# Patient Record
Sex: MALE | Race: WHITE | Employment: FULL TIME | URBAN - METROPOLITAN AREA
[De-identification: names, ages, dates, MRNs, and addresses within clinical notes are randomized per-mention and may not be internally consistent; named-entity substitution may affect disease eponyms.]

---

## 2017-01-12 ENCOUNTER — OFFICE VISIT (OUTPATIENT)
Dept: CARDIOLOGY CLINIC | Age: 57
End: 2017-01-12

## 2017-01-12 VITALS
BODY MASS INDEX: 25.31 KG/M2 | DIASTOLIC BLOOD PRESSURE: 68 MMHG | HEART RATE: 68 BPM | SYSTOLIC BLOOD PRESSURE: 110 MMHG | RESPIRATION RATE: 16 BRPM | OXYGEN SATURATION: 98 % | HEIGHT: 73 IN | WEIGHT: 191 LBS

## 2017-01-12 DIAGNOSIS — I42.1 IHSS (IDIOPATHIC HYPERTROPHIC SUBAORTIC STENOSIS) (HCC): Primary | ICD-10-CM

## 2017-01-12 DIAGNOSIS — R00.2 PALPITATIONS: ICD-10-CM

## 2017-01-12 DIAGNOSIS — I34.89 SYSTOLIC ANTERIOR MOVEMENT OF MITRAL VALVE: ICD-10-CM

## 2017-01-12 DIAGNOSIS — R00.2 HEART PALPITATIONS: ICD-10-CM

## 2017-01-12 DIAGNOSIS — I34.0 NON-RHEUMATIC MITRAL REGURGITATION: ICD-10-CM

## 2017-01-12 DIAGNOSIS — Z82.49 FAMILY HISTORY OF CARDIAC ARREST: ICD-10-CM

## 2017-01-12 NOTE — PATIENT INSTRUCTIONS
Schedule to obtain a 48 hr holter    Schedule an echocardiogram, we will call you with results    Schedule an appointment with Dr. Asad Wick to discuss ILR    Follow up with Dr. Abe Ayoub in 6 months

## 2017-01-12 NOTE — MR AVS SNAPSHOT
Visit Information Date & Time Provider Department Dept. Phone Encounter #  
 1/12/2017 10:00 AM Naomie Pizarro MD [facility] [phone] [id number] Upcoming Health Maintenance Date Due Hepatitis C Screening 1960 Pneumococcal 19-64 Medium Risk (1 of 1 - PPSV23) 12/16/1980 DTaP/Tdap/Td series (1 - Tdap) 12/16/1982 FOBT Q 1 YEAR AGE 50-75 12/16/2011 INFLUENZA AGE 9 TO ADULT 8/1/2016 Allergies as of 1/12/2017  Review Complete On: 1/12/2017 By: Jude Baldwin No Known Allergies Current Immunizations  Never Reviewed No immunizations on file. Not reviewed this visit You Were Diagnosed With   
  
 Codes Comments Non-rheumatic mitral regurgitation    -  Primary ICD-10-CM: I34.0 ICD-9-CM: 424.0 Hypertrophic obstructive cardiomyopathy (HOCM) ([facility])     ICD-10-CM: I42.1 ICD-9-CM: 425.11 Systolic anterior movement of mitral valve     ICD-10-CM: I34.8 ICD-9-CM: 424.0 Palpitations     ICD-10-CM: R00.2 ICD-9-CM: 785.1 IHSS (idiopathic hypertrophic subaortic stenosis) ([facility])     ICD-10-CM: I42.1 ICD-9-CM: 425.11 Vitals BP Pulse Resp Height(growth percentile) Weight(growth percentile) SpO2  
 110/68 (BP 1 Location: Left arm, BP Patient Position: Sitting) 68 16 6' 1\" (1.854 m) 191 lb (86.6 kg) 98% BMI Smoking Status 25.2 kg/m2 Never Smoker BMI and BSA Data Body Mass Index Body Surface Area  
 25.2 kg/m 2 2.11 m 2 Preferred Pharmacy Pharmacy Name Phone [address] [facility] [phone] Your Updated Medication List  
  
   
This list is accurate as of: 1/12/17 10:31 AM.  Always use your most recent med list.  
  
  
  
  
 aspirin 325 mg tablet Commonly known as:  ASPIRIN Take 325 mg by mouth daily. fluticasone 50 mcg/actuation nasal spray Commonly known as:  Lee Jacobo multivitamin tablet Commonly known as:  ONE A DAY Take 1 Tab by mouth daily. nebivolol 5 mg tablet Commonly known as:  BYSTOLIC Take 1 Tab by mouth daily. ZyrTEC 10 mg Cap Generic drug:  Cetirizine Take  by mouth. We Performed the Following AMB POC EKG ROUTINE W/ 12 LEADS, INTER & REP [84752 CPT(R)] To-Do List   
 01/12/2017 ECHO:  2D ECHO COMPLETE ADULT (TTE) W OR WO CONTR   
  
 01/12/2017 ECG:  ECG HOLTER MONITOR, UP TO 48 HRS Patient Instructions Schedule to obtain a 48 hr holter Schedule an echocardiogram, we will call you with results Schedule an appointment with Dr. Lorne Gambino to discuss ILR Follow up with Dr. Emmanuel Larsen in 6 months Introducing [facility] SERVICES! Dear Penelope Allan: 
Thank you for requesting a [employer] account. Our records indicate that you already have an active [employer] account. You can access your account anytime at [email or web address] Did you know that you can access your hospital and ER discharge instructions at any time in [employer]? You can also review all of your test results from your hospital stay or ER visit. Additional Information If you have questions, please visit the Frequently Asked Questions section of the [employer] website at [email or web address] Remember, [employer] is NOT to be used for urgent needs. For medical emergencies, dial 911. Now available from your iPhone and Android! Please provide this summary of care documentation to your next provider. Your primary care clinician is listed as Ellie Buck. If you have any questions after today's visit, please call [phone].

## 2017-01-12 NOTE — PROGRESS NOTES
Mikey Null     1960       Grant Suarez MD, [facility]  Date of Visit-1/12/2017   PCP is Brian Reynaga MD   [facility] [place]  [facility]  HPI:  Mikey Null is a 55 y.o. male   Patient with previous LV outflow tract obstructions and wall thickness consitent with IHSS. Last echo had shown regression of wall thickness. Patient is on bystolic. He reports persistent skipped beats occurring more at night during the month of December. He states he stopped drinking alcohol and caffeine. Since then his symptoms have improved significantly. Mild sensations of skipped beats have continued and typically occur after he eats a meal. He is physically active. Denies chest pain, edema, syncope or shortness of breath at rest, has no tachycardia    EKG: sinus rhythm within normal limits     Assessment/Plan:       Palpitations/arrhythmia   -unclear if atrial or ventricular given [facility]  -plan 48 hour Holter and refer to Dr Kalina Santos for ILR  -PVC's did decrease with stopping consuming caffeine and alcohol but there are times during the day where he likely cannot sense them     HOCM/MR/JUNIE  -repeat echo   -no exercise limitation on Bystolic     Lipids  -diet controlled     Follow up in 6 months   Future Appointments  Date Time Provider Johnathon Crowei   2/7/2017 8:00 AM ECHO, [address]   2/23/2017 3:00 PM Duke Mendoza  [address]   6/8/2017 1:40 PM MD SAMARA Mcclendon SCHED      Impression:   1. IHSS (idiopathic hypertrophic subaortic stenosis) ([address]    2. Systolic anterior movement of mitral valve    3. Palpitations    4. Non-rheumatic mitral regurgitation    5. Heart palpitations    6. Family history of cardiac arrest       Cardiac History:   Echo 2-23-16=dynamic obstruction during Paolo Bolder in the  outflow tract with a peak velocity of 3.8 cm/s and a peak gradient of 60  mmHg.  There was dynamic obstruction at rest in the outflow tract with a  peak velocity 3.3 m/s and a peak gradient of 45 mmHg. Systolic function  was normal. Ejection fraction  55 % to 65 %. Wall thickness was  mildly to moderately increased  Mitral valve:  mild regurgitation. Holter Monitor, 2012: PVCs , short run of bigeminy but no VT.  ECHO 12 LVEF of 75 % severe asymmetric hypertrophy of the septum. dynamic obstruction during Valsalva in the outflow tract, peak velocity of 3.6 cm/s and a peak gradient of 50 mmHg. IVS= 17 mm Pw= 9 mm.   mild mitral valve regurgitation systolic anterior motion (JUNIE)of the chordal structures. JENNIFER g no evidence of ischemia with the patient walking 9 minutes on the David Protocol and achieving 94% of MPHR. MRI which was terminated early on 13 due to a claustrophobic reaction demonstrated an LVEF of 76% with mild septal hypertrophy. There was a very steep aorto-septal angle of 107 degrees due to the sigmoid septum and the steep angulation of the aortic root creating an unfavorable mitral valve apparatus apposition with the septum leading to significant JUNIE of the chordae and left ventricular outflow tract obstruction. Social History: Nonsmoker. Two to three beers per day. Works as an , has five children. He is  and does a lot of activities with his children. He does some foreign travel though, mainly [place], once to [place]. He has had no illnesses there. maternal uncle with a history of CABG and MI who  at age 46 and a maternal grandfather with known coronary disease and \"heart attacks\" who  at age 50. Daughter had sudden collapse, cardiac arrest with exercising, seen at [facility]. ROS-except as noted above. . A complete cardiac and respiratory are reviewed and negative except as above ; Resp-denies wheezing  or productive cough,.  Const- No unusual weight loss or fever; Neuro-no recent seizure or CVA ; GI- No BRBPR, abdom pain, bloating ; - no  hematuria   see supplement sheet, initialed and to be scanned by staff  Past Medical History   Diagnosis Date    Asymmetric septal hypertrophy [facility])      MRI March 2013 and echo    Family history of cardiac arrest      luiser on treadmill, followed by EP at [facility] Dr Maddison [place] Hypertrophic cardiomyopathy ([facility]      dynamic obstruction during Valsalva in the outflow tract, peak velocity of 3.6 cm/s and a peak gradient of 50 mmHg    Mitral regurgitation     Systolic anterior movement of mitral valve      JUNIE      Social Hx= reports that he has never smoked. He does not have any smokeless tobacco history on file. He reports that he drinks alcohol. Exam and Labs:    Visit Vitals    /68 (BP 1 Location: Left arm, BP Patient Position: Sitting)    Pulse 68    Resp 16    Ht 6' 1\" (1.854 m)    Wt 191 lb (86.6 kg)    SpO2 98%    BMI 25.2 kg/m2      Constitutional:  NAD, comfortable  Head: NC,AT. Eyes: No scleral icterus. Neck:  Neck supple. No JVD present. Throat: moist mucous membranes. Chest: Effort normal & normal respiratory excursion . Neurological: alert, conversant and oriented . Skin: Skin is not cold. No obvious systemic rash noted. Not diaphoretic. No erythema. Psychiatric:  Grossly normal mood and affect. Behavior appears normal. Extremities:  no clubbing or cyanosis. Abdomen: non distended    Lungs:breath sounds normal. No stridor. distress, wheezes or  Rales. Heart:    normal rate, regular rhythm, normal S1, S2, 2/6 long systolic murmur well heard throughout the precordium evenly, rubs, clicks or gallops , PMI non displaced. Edema: Edema is none. No results found for: CHOL, CHOLX, CHLST, CHOLV, HDL, LDL, DLDL, LDLC, DLDLP, TGL, TGLX, TRIGL, TRIGP, CHHD, CHHDX  No results found for this or any previous visit.  No results found for: NA, K, CL, CO2, AGAP, GLU, BUN, CREA, BUCR, GFRAA, GFRNA, CA, GFRAA   Wt Readings from Last 3 Encounters:   01/12/17 191 lb (86.6 kg)   08/23/16 189 lb 6.4 oz (85.9 kg) 02/23/16 216 lb 6.4 oz (98.2 kg)      BP Readings from Last 3 Encounters:   01/12/17 110/68   08/23/16 100/60   02/23/16 116/82        Current Outpatient Prescriptions   Medication Sig    nebivolol (BYSTOLIC) 5 mg tablet Take 1 Tab by mouth daily.  fluticasone (FLONASE) 50 mcg/actuation nasal spray     Cetirizine (ZYRTEC) 10 mg cap Take  by mouth.  aspirin (ASPIRIN) 325 mg tablet Take 325 mg by mouth daily.  multivitamin (ONE A DAY) tablet Take 1 Tab by mouth daily. No current facility-administered medications for this visit. Impression see above.     Written by Curt Walton, as dictated by Kevin Pritchett MD.

## 2017-01-27 ENCOUNTER — CLINICAL SUPPORT (OUTPATIENT)
Dept: CARDIOLOGY CLINIC | Age: 57
End: 2017-01-27

## 2017-01-27 DIAGNOSIS — I42.1 IHSS (IDIOPATHIC HYPERTROPHIC SUBAORTIC STENOSIS) (HCC): ICD-10-CM

## 2017-01-27 DIAGNOSIS — R00.2 PALPITATIONS: ICD-10-CM

## 2017-02-07 ENCOUNTER — TELEPHONE (OUTPATIENT)
Dept: CARDIOLOGY CLINIC | Age: 57
End: 2017-02-07

## 2017-02-07 ENCOUNTER — CLINICAL SUPPORT (OUTPATIENT)
Dept: CARDIOLOGY CLINIC | Age: 57
End: 2017-02-07

## 2017-02-07 DIAGNOSIS — I42.1 HYPERTROPHIC OBSTRUCTIVE CARDIOMYOPATHY (HOCM) (HCC): ICD-10-CM

## 2017-02-07 DIAGNOSIS — I34.89 SYSTOLIC ANTERIOR MOVEMENT OF MITRAL VALVE: ICD-10-CM

## 2017-02-07 DIAGNOSIS — I34.0 NON-RHEUMATIC MITRAL REGURGITATION: ICD-10-CM

## 2017-02-07 DIAGNOSIS — R94.31 ABNORMAL EKG: ICD-10-CM

## 2017-02-07 DIAGNOSIS — Z95.818 STATUS POST PLACEMENT OF IMPLANTABLE LOOP RECORDER: ICD-10-CM

## 2017-02-07 DIAGNOSIS — Z82.49 FAMILY HISTORY OF CARDIAC ARREST: ICD-10-CM

## 2017-02-07 DIAGNOSIS — E78.00 HYPERCHOLESTEREMIA: ICD-10-CM

## 2017-02-07 DIAGNOSIS — I42.1 IHSS (IDIOPATHIC HYPERTROPHIC SUBAORTIC STENOSIS) (HCC): ICD-10-CM

## 2017-02-07 NOTE — PROGRESS NOTES
Having some PVCs as expected , with no atrial arrhythmia or VTach so that is good  Has echo tomorrow and Dr Mahsa Alvarado appt later  So keep same plans  2/7/2017   8:00 AM    Qing YUSUF 46  2/23/2017  3:00 PM    MD Kiah Rodriguez 78  6/8/2017   1:40 PM    MD Kiah Pavon 78

## 2017-02-07 NOTE — TELEPHONE ENCOUNTER
----- Message from Annalisa Cook MD sent at 2/6/2017  7:06 PM EST -----  Having some PVCs as expected , with no atrial arrhythmia or VTach so that is good  Has echo tomorrow and Dr Bruno Collins appt later  So keep same plans  2/7/2017   8:00 AM    Qing YUSUF  2/23/2017  3:00 PM    MD Kiah Heck 78  6/8/2017   1:40 PM    MD Kiah Lucas 78

## 2017-02-13 NOTE — PROGRESS NOTES
NO CHANGE, GOOD FX, MODEST GRADIENT ACROSS THE LV  2/23/2017  3:00 PM    Adolfo Rios MD            [place] [address]  6/8/2017   1:40 PM    Grant Bush MD        [address] [facility]

## 2017-02-14 ENCOUNTER — TELEPHONE (OUTPATIENT)
Dept: CARDIOLOGY CLINIC | Age: 57
End: 2017-02-14

## 2017-02-14 NOTE — TELEPHONE ENCOUNTER
----- Message from Naomie Pizarro MD sent at 2/12/2017  8:45 PM EST -----  NO CHANGE, GOOD FX, MODEST GRADIENT ACROSS THE LV  2/23/2017  3:00 PM    Fabio Arcos MD            [place] [address]  6/8/2017   1:40 PM    Grant Tamayo MD        [address] [facility]

## 2017-02-23 ENCOUNTER — TELEPHONE (OUTPATIENT)
Dept: CARDIOLOGY CLINIC | Age: 57
End: 2017-02-23

## 2017-03-10 ENCOUNTER — OFFICE VISIT (OUTPATIENT)
Dept: CARDIOLOGY CLINIC | Age: 57
End: 2017-03-10

## 2017-03-10 ENCOUNTER — TELEPHONE (OUTPATIENT)
Dept: CARDIOLOGY CLINIC | Age: 57
End: 2017-03-10

## 2017-03-10 VITALS
HEART RATE: 60 BPM | WEIGHT: 189 LBS | HEIGHT: 73 IN | DIASTOLIC BLOOD PRESSURE: 64 MMHG | BODY MASS INDEX: 25.05 KG/M2 | SYSTOLIC BLOOD PRESSURE: 92 MMHG

## 2017-03-10 DIAGNOSIS — I42.1 HYPERTROPHIC OBSTRUCTIVE CARDIOMYOPATHY (HOCM) (HCC): ICD-10-CM

## 2017-03-10 DIAGNOSIS — Z82.49 FAMILY HISTORY OF CARDIAC ARREST: ICD-10-CM

## 2017-03-10 DIAGNOSIS — E78.00 HYPERCHOLESTEREMIA: ICD-10-CM

## 2017-03-10 DIAGNOSIS — I34.0 NON-RHEUMATIC MITRAL REGURGITATION: ICD-10-CM

## 2017-03-10 DIAGNOSIS — I49.3 PVC (PREMATURE VENTRICULAR CONTRACTION): Primary | ICD-10-CM

## 2017-03-10 DIAGNOSIS — I34.89 SYSTOLIC ANTERIOR MOVEMENT OF MITRAL VALVE: ICD-10-CM

## 2017-03-10 DIAGNOSIS — R94.31 ABNORMAL EKG: ICD-10-CM

## 2017-03-10 RX ORDER — CHLORHEXIDINE GLUCONATE 4 G/100ML
SOLUTION TOPICAL
Qty: 1 BOTTLE | Refills: 0 | Status: SHIPPED | OUTPATIENT
Start: 2017-03-10 | End: 2017-04-06 | Stop reason: ALTCHOICE

## 2017-03-10 NOTE — TELEPHONE ENCOUNTER
Spoke with Mr. Kingsley Stevenson regarding his after visit instructions from today's appointment. Mr. Kingsley Stevenson left the office before we were able to discuss. He is agreeable to all dates/times, requests lab slips be mailed and instructions sent via [address] This was completed per his request. He was advised to contact office by phone or by [address] if any further questions or concerns. The patient verbalized understanding.

## 2017-03-10 NOTE — PROGRESS NOTES
Cardiac Electrophysiology Office Consultation Note     Subjective:      Xu Garcias is a 55 y.o. patient who is seen for evaluation of PVCs on holter  He has had 1000 or so of unifocal superior axis PVCs and they are relatively narrow consistent with inferoseptal origin  He has IHSS and since December he has felt PVCs despite taking bystolic  He has several echos and 2 stress echos  He has IHSS with high LVOT gradient with the most recent 2/2017 over 60 mmHg  On stress echo he had  mmHg but not sure if this is up or down during exercise based on report  He has no syncope or cardiac arrest  He has 5 children, 2 are fraternal twin  One of the twin was in [employer] and had VF arrest in the gym in 2012   She was transferred to [facility] and had S ICD by Dr Alexandra Turner  She then has had 2 VF ICD shocks and is now told to go to [facility] as there is concern for ARVC. She was in [place] at the time of third cardiac arrest  Her twin sister and other 3 siblings have been seen by Dr Aurea Martel and pediatric cardiologist and he is told that they are fine   He is avid exercise person    Patient Active Problem List   Diagnosis Code    Abnormal EKG R94.31    Hypertrophic obstructive cardiomyopathy (HOCM) ([facility] I42.1    Family history of cardiac arrest Z82.49    Mitral regurgitation T22.7    Systolic anterior movement of mitral valve I34.8    Hypercholesteremia E78.00     Current Outpatient Prescriptions   Medication Sig Dispense Refill    chlorhexidine (HIBICLENS) 4 % liquid Apply to upper chest each of 5 nights prior to procedure. 1 Bottle 0    nebivolol (BYSTOLIC) 5 mg tablet Take 1 Tab by mouth daily. 90 Tab 3    fluticasone (FLONASE) 50 mcg/actuation nasal spray       Cetirizine (ZYRTEC) 10 mg cap Take  by mouth.  aspirin (ASPIRIN) 325 mg tablet Take 325 mg by mouth daily.  multivitamin (ONE A DAY) tablet Take 1 Tab by mouth daily.        No Known Allergies  Past Medical History: Diagnosis Date    Asymmetric septal hypertrophy [facility])     MRI March 2013 and echo    Family history of cardiac arrest     светлана on treadmill, followed by EP at [facility] Dr Genaro Grewal Hypertrophic cardiomyopathy ([facility] [address]     dynamic obstruction during Valsalva in the outflow tract, peak velocity of 3.6 cm/s and a peak gradient of 50 mmHg    Mitral regurgitation     Systolic anterior movement of mitral valve     JUNIE     No past surgical history on file. No family history on file. Social History   Substance Use Topics    Smoking status: Never Smoker    Smokeless tobacco: Not on file    Alcohol use 0.0 oz/week     0 Standard drinks or equivalent per week      Comment: occ        Review of Systems:   Constitutional: Negative for fever, chills, weight loss, malaise/fatigue. HEENT: Negative for nosebleeds, vision changes. Respiratory: Negative for cough, hemoptysis  Cardiovascular: Negative for chest pain, + palpitations, no orthopnea, claudication, leg swelling, syncope, and PND. Gastrointestinal: Negative for nausea, vomiting, diarrhea, blood in stool and melena. Genitourinary: Negative for dysuria, and hematuria. Musculoskeletal: Negative for myalgias, arthralgia. Skin: Negative for rash. Heme: Does not bleed or bruise easily. Neurological: Negative for speech change and focal weakness     Objective:     Visit Vitals    BP 92/64 (BP 1 Location: Left arm, BP Patient Position: Sitting)    Pulse 60    Ht 6' 1\" (1.854 m)    Wt 189 lb (85.7 kg)    BMI 24.94 kg/m2      Physical Exam:   Constitutional: well-developed and well-nourished. No respiratory distress. Head: Normocephalic and atraumatic. Eyes: Pupils are equal, round  ENT: hearing normal  Neck: supple. No JVD present. Cardiovascular: Normal rate, regular rhythm. Exam reveals no gallop and no friction rub. 3/6 systolic LLSB murmur heard. Pulmonary/Chest: Effort normal and breath sounds normal. No wheezes. Abdominal: Soft, no tenderness. Musculoskeletal: no edema. Neurological: alert,oriented. Skin: Skin is warm and dry  Psychiatric: normal mood and affect. Behavior is normal. Judgment and thought content normal.      EKG: NSR with tall T waves      Assessment/Plan:       ICD-10-CM ICD-9-CM    1. PVC (premature ventricular contraction) I49.3 427.69    2. Abnormal EKG R94.31 794.31 MRI CARDIAC MORPH FUNC W WO CONT      CBC WITH AUTOMATED DIFF      METABOLIC PANEL, BASIC   3. Hypertrophic obstructive cardiomyopathy (HOCM) (HCC) I42.1 425.11 MRI CARDIAC MORPH FUNC W WO CONT      CBC WITH AUTOMATED DIFF      METABOLIC PANEL, BASIC   4. Family history of cardiac arrest Z82.49 V17.49 MRI CARDIAC MORPH FUNC W WO CONT      CBC WITH AUTOMATED DIFF      METABOLIC PANEL, BASIC   5. Non-rheumatic mitral regurgitation I34.0 424.0 MRI CARDIAC MORPH FUNC W WO CONT      CBC WITH AUTOMATED DIFF      METABOLIC PANEL, BASIC   6. Systolic anterior movement of mitral valve I34.8 424.0 MRI CARDIAC MORPH FUNC W WO CONT      CBC WITH AUTOMATED DIFF      METABOLIC PANEL, BASIC   7.  Hypercholesteremia E78.00 272.0 MRI CARDIAC MORPH FUNC W WO CONT      CBC WITH AUTOMATED DIFF      METABOLIC PANEL, BASIC     reviewed diet, exercise and weight control  reviewed medications and side effects in detail   I recommend him not to do vigorous exercise for now  He had partial cardiac MRI in 3/2013 with inferoseptum 15 mm and scar  He needs sedation for repeat MRI and will schedule  I agree that he has partial risk factors for sudden death with IHSS and not yet clear cut to recommend ICD but if his daughter does have IHSS and not ARVC based on [facility] evaluation or if he has ILR with VT he meets indication for ICD  MRI data will add more information and indication for ICD with LV thickness and scar burden  His BP is low so cannot increase bystolic dose  He agrees to proceed as he is considered moderate risk for now for sudden cardiac death and we need to risk stratify him    Follow-up Disposition:  Return in about 4 weeks (around 4/7/2017). Thank you for involving me in this patient's care and please call with further concerns or questions. Lisbet Greenwood M.D.   Electrophysiology/Cardiology  [facility] [place]  [address] [facility], [address], [address] [place] [address]  [phone]

## 2017-03-10 NOTE — MR AVS SNAPSHOT
Visit Information Date & Time Provider Department Dept. Phone Encounter #  
 3/10/2017  8:20 AM Abi Rm MD [facility] Panda Thacker [phone] [id number] Your Appointments 4/5/2017 11:20 AM  
ESTABLISHED PATIENT with Abi Rm MD  
[facility] ([facility]) Appt Note: 2 wk f/u ILR insertion wound check [address] [place] [address]  
[address] [facility]  
  
    
 6/8/2017  1:40 PM  
ESTABLISHED PATIENT with Lillian Aguirre MD  
[facility] ([facility]) Appt Note: 6 month f/u appt per Dr. Sloane Wood [address]  
[address] [address] Upcoming Health Maintenance Date Due Hepatitis C Screening 1960 Pneumococcal 19-64 Medium Risk (1 of 1 - PPSV23) 12/16/1980 DTaP/Tdap/Td series (1 - Tdap) 12/16/1982 FOBT Q 1 YEAR AGE 50-75 12/16/2011 INFLUENZA AGE 9 TO ADULT 8/1/2016 Allergies as of 3/10/2017  Review Complete On: 3/10/2017 By: Abi Rm MD  
 No Known Allergies Current Immunizations  Never Reviewed No immunizations on file. Not reviewed this visit You Were Diagnosed With   
  
 Codes Comments Abnormal EKG     ICD-10-CM: R94.31 
ICD-9-CM: 794.31 Hypertrophic obstructive cardiomyopathy (HOCM) (HCC)     ICD-10-CM: I42.1 ICD-9-CM: 425.11 Family history of cardiac arrest     ICD-10-CM: Z82.49 
ICD-9-CM: V17.49 Non-rheumatic mitral regurgitation     ICD-10-CM: I34.0 ICD-9-CM: 424.0 Systolic anterior movement of mitral valve     ICD-10-CM: I34.8 ICD-9-CM: 424.0 Hypercholesteremia     ICD-10-CM: E78.00 ICD-9-CM: 272.0 Vitals BP Pulse Height(growth percentile) Weight(growth percentile) BMI Smoking Status 92/64 (BP 1 Location: Left arm, BP Patient Position: Sitting) 60 6' 1\" (1.854 m) 189 lb (85.7 kg) 24.94 kg/m2 Never Smoker Vitals History BMI and BSA Data Body Mass Index Body Surface Area 24.94 kg/m 2 2.1 m 2 Preferred Pharmacy Pharmacy Name Phone [facility], [address] [phone] Your Updated Medication List  
  
   
This list is accurate as of: 3/10/17  9:42 AM.  Always use your most recent med list.  
  
  
  
  
 aspirin 325 mg tablet Commonly known as:  ASPIRIN Take 325 mg by mouth daily. chlorhexidine 4 % liquid Commonly known as:  HIBICLENS Apply to upper chest each of 5 nights prior to procedure. fluticasone 50 mcg/actuation nasal spray Commonly known as:  FLONASE  
  
 multivitamin tablet Commonly known as:  ONE A DAY Take 1 Tab by mouth daily. nebivolol 5 mg tablet Commonly known as:  BYSTOLIC Take 1 Tab by mouth daily. ZyrTEC 10 mg Cap Generic drug:  Cetirizine Take  by mouth. Prescriptions Sent to Pharmacy Refills  
 chlorhexidine (HIBICLENS) 4 % liquid 0 Sig: Apply to upper chest each of 5 nights prior to procedure. Class: Normal  
 Pharmacy: [facility], [address] #: [phone] We Performed the Following CBC WITH AUTOMATED DIFF [86518 CPT(R)] METABOLIC PANEL, BASIC [18919 CPT(R)] To-Do List   
 Around 03/10/2017 Imaging:  MRI CARDI[facility] F[facility] W WO CONT Referral Information Referral ID Referred By Referred To  
  
 [id number] [facility], [address] [facility] [address] Not Available Visits Status Start Date End Date 1 New Request 3/10/17 3/10/18 If your referral has a status of pending review or denied, additional information will be sent to support the outcome of this decision. Patient Instructions You will be scheduled for cardiac MRI with sedation. A hospital  will call you regarding date/time for this procedure. Your ILR insertion procedure has been scheduled for 3/22/2017 at 1:00pm, at [facility]. 
 
Please report to Admitting Department by 11:30am, or 2 hours prior to your scheduled procedure. Please bring a list of your current medications and medication bottles, if able, to the hospital on this day. You will need to have nothing to eat or drink after 7:00am, the day of your procedure. You may have small sips of water, if needed, to take with your medication. You will need labs drawn prior to your procedure. Please go to [facility] to have this done as soon as you are able. You should not stop your medications prior to your scheduled procedure. After your procedure, you will need to follow up with Dr. Thanh Smith. Your follow-up appointment has been scheduled for 4/5/2017 at 11:20am.  
 
 
Directions Hibiclens 4%: Start cleanse 5 days prior to procedure 1. Rinse area (upper chest and upper arms) with water. 2. Apply minimum amount necessary to scrub the upper chest area from shoulder/neck to mid line of chest and to below the nipple each of  5 nights before the day of the procedure 3. Let solution dry. Introducing [facility]! Dear Edmund Hudson: 
Thank you for requesting a [employer]xCell account. Our records indicate that you already have an active TxCell account. You can access your account anytime at [email or web address] Did you know that you can access your hospital and ER discharge instructions at any time in TxCell? You can also review all of your test results from your hospital stay or ER visit. Additional Information If you have questions, please visit the Frequently Asked Questions section of the TxCell website at [email or web address] Remember, TxCell is NOT to be used for urgent needs.  For medical emergencies, dial 911. Now available from your iPhone and Android! Please provide this summary of care documentation to your next provider. Your primary care clinician is listed as Oanh Pete. If you have any questions after today's visit, please call [phone].

## 2017-03-10 NOTE — PROGRESS NOTES
Chief Complaint   Patient presents with    Cardiomyopathy    Irregular Heart Beat     PVC's on holter, referred by Dr Isabel Medel     Verified patient with two types of identifiers. Verified medications with the patient. Verified pharmacy with patient.

## 2017-03-10 NOTE — PATIENT INSTRUCTIONS
You will be scheduled for cardiac MRI with sedation. A hospital  will call you regarding date/time for this procedure. Your ILR insertion procedure has been scheduled for 3/22/2017 at 1:00pm, at [facility].    Please report to Admitting Department by 11:30am, or 2 hours prior to your scheduled procedure. Please bring a list of your current medications and medication bottles, if able, to the hospital on this day. You will need to have nothing to eat or drink after 7:00am, the day of your procedure. You may have small sips of water, if needed, to take with your medication. You will need labs drawn prior to your procedure. Please go to [facility] to have this done as soon as you are able. You should not stop your medications prior to your scheduled procedure. After your procedure, you will need to follow up with Dr. Angella Villagomez. Your follow-up appointment has been scheduled for 4/5/2017 at 11:20am.       Directions Hibiclens 4%: Start cleanse 5 days prior to procedure   1. Rinse area (upper chest and upper arms) with water. 2. Apply minimum amount necessary to scrub the upper chest area from shoulder/neck to mid line of chest and to below the nipple each of  5 nights before the day of the procedure  3. Let solution dry.

## 2017-03-13 ENCOUNTER — TELEPHONE (OUTPATIENT)
Dept: CARDIOLOGY CLINIC | Age: 57
End: 2017-03-13

## 2017-03-13 NOTE — TELEPHONE ENCOUNTER
Attempted to reach patient by telephone. A message was left for return call. Previous order did request procedural sedation, will notify See Lopez when she returns my call.

## 2017-03-14 NOTE — TELEPHONE ENCOUNTER
Mayo Sidhu with coordination of care calling in regards to the order for the mri. States that sedation is listed as a note but the order itself does not include \"with sedation. \" Requests a call back at [phone]. Thanks!

## 2017-03-14 NOTE — TELEPHONE ENCOUNTER
Spoke with Elise Mantilla in [facility]. She states order will need to state sedation in the order itself, although is unsure how to do this. She will speak with the facility to determine how to best address this and will return call to our office to advise.

## 2017-03-17 RX ORDER — CEFAZOLIN SODIUM IN 0.9 % NACL 2 G/50 ML
2 INTRAVENOUS SOLUTION, PIGGYBACK (ML) INTRAVENOUS ONCE
Status: CANCELLED | OUTPATIENT
Start: 2017-03-17 | End: 2017-03-17

## 2017-03-17 RX ORDER — SODIUM CHLORIDE 0.9 % (FLUSH) 0.9 %
5-10 SYRINGE (ML) INJECTION EVERY 8 HOURS
Status: CANCELLED | OUTPATIENT
Start: 2017-03-17

## 2017-03-17 RX ORDER — SODIUM CHLORIDE 0.9 % (FLUSH) 0.9 %
5-10 SYRINGE (ML) INJECTION AS NEEDED
Status: CANCELLED | OUTPATIENT
Start: 2017-03-17

## 2017-03-18 LAB
BASOPHILS # BLD AUTO: 0.1 X10E3/UL (ref 0–0.2)
BASOPHILS NFR BLD AUTO: 1 %
BUN SERPL-MCNC: 20 MG/DL (ref 6–24)
BUN/CREAT SERPL: 27 (ref 9–20)
CALCIUM SERPL-MCNC: 10 MG/DL (ref 8.7–10.2)
CHLORIDE SERPL-SCNC: 97 MMOL/L (ref 96–106)
CO2 SERPL-SCNC: 24 MMOL/L (ref 18–29)
CREAT SERPL-MCNC: 0.74 MG/DL (ref 0.76–1.27)
EOSINOPHIL # BLD AUTO: 0.1 X10E3/UL (ref 0–0.4)
EOSINOPHIL NFR BLD AUTO: 2 %
ERYTHROCYTE [DISTWIDTH] IN BLOOD BY AUTOMATED COUNT: 13.2 % (ref 12.3–15.4)
GLUCOSE SERPL-MCNC: 99 MG/DL (ref 65–99)
HCT VFR BLD AUTO: 42.3 % (ref 37.5–51)
HGB BLD-MCNC: 15.1 G/DL (ref 12.6–17.7)
IMM GRANULOCYTES # BLD: 0 X10E3/UL (ref 0–0.1)
IMM GRANULOCYTES NFR BLD: 0 %
LYMPHOCYTES # BLD AUTO: 2.2 X10E3/UL (ref 0.7–3.1)
LYMPHOCYTES NFR BLD AUTO: 39 %
MCH RBC QN AUTO: 33.4 PG (ref 26.6–33)
MCHC RBC AUTO-ENTMCNC: 35.7 G/DL (ref 31.5–35.7)
MCV RBC AUTO: 94 FL (ref 79–97)
MONOCYTES # BLD AUTO: 0.5 X10E3/UL (ref 0.1–0.9)
MONOCYTES NFR BLD AUTO: 9 %
NEUTROPHILS # BLD AUTO: 2.8 X10E3/UL (ref 1.4–7)
NEUTROPHILS NFR BLD AUTO: 49 %
PLATELET # BLD AUTO: 266 X10E3/UL (ref 150–379)
POTASSIUM SERPL-SCNC: 4.3 MMOL/L (ref 3.5–5.2)
RBC # BLD AUTO: 4.52 X10E6/UL (ref 4.14–5.8)
SODIUM SERPL-SCNC: 140 MMOL/L (ref 134–144)
WBC # BLD AUTO: 5.6 X10E3/UL (ref 3.4–10.8)

## 2017-03-22 ENCOUNTER — HOSPITAL ENCOUNTER (OUTPATIENT)
Dept: CARDIAC CATH/INVASIVE PROCEDURES | Age: 57
Discharge: HOME | End: 2017-03-22
Attending: INTERNAL MEDICINE | Admitting: INTERNAL MEDICINE
Payer: SELF-PAY

## 2017-03-22 VITALS
RESPIRATION RATE: 16 BRPM | WEIGHT: 185 LBS | BODY MASS INDEX: 23 KG/M2 | TEMPERATURE: 97.9 F | SYSTOLIC BLOOD PRESSURE: 127 MMHG | HEIGHT: 75 IN | HEART RATE: 65 BPM | DIASTOLIC BLOOD PRESSURE: 69 MMHG | OXYGEN SATURATION: 100 %

## 2017-03-22 PROBLEM — Z95.818 STATUS POST PLACEMENT OF IMPLANTABLE LOOP RECORDER: Status: ACTIVE | Noted: 2017-03-22

## 2017-03-22 PROBLEM — I49.3 SYMPTOMATIC PVCS: Status: ACTIVE | Noted: 2017-03-22

## 2017-03-22 PROCEDURE — C1764 EVENT RECORDER, CARDIAC: HCPCS

## 2017-03-22 PROCEDURE — 33282 CARDIAC CATHETERIZATION: CPT

## 2017-03-22 PROCEDURE — 74011250636 HC RX REV CODE- 250/636: Performed by: INTERNAL MEDICINE

## 2017-03-22 PROCEDURE — 74011000250 HC RX REV CODE- 250: Performed by: INTERNAL MEDICINE

## 2017-03-22 RX ORDER — SODIUM CHLORIDE 0.9 % (FLUSH) 0.9 %
5-10 SYRINGE (ML) INJECTION AS NEEDED
Status: DISCONTINUED | OUTPATIENT
Start: 2017-03-22 | End: 2017-03-22 | Stop reason: HOSPADM

## 2017-03-22 RX ORDER — LIDOCAINE HYDROCHLORIDE AND EPINEPHRINE 10; 10 MG/ML; UG/ML
1.5 INJECTION, SOLUTION INFILTRATION; PERINEURAL
Status: DISCONTINUED | OUTPATIENT
Start: 2017-03-22 | End: 2017-03-22 | Stop reason: HOSPADM

## 2017-03-22 RX ORDER — LIDOCAINE HYDROCHLORIDE AND EPINEPHRINE 10; 10 MG/ML; UG/ML
1.5 INJECTION, SOLUTION INFILTRATION; PERINEURAL
Status: DISCONTINUED | OUTPATIENT
Start: 2017-03-22 | End: 2017-03-22 | Stop reason: CLARIF

## 2017-03-22 RX ORDER — CEPHALEXIN 250 MG/1
250 CAPSULE ORAL 3 TIMES DAILY
Qty: 9 CAP | Refills: 0 | Status: SHIPPED | OUTPATIENT
Start: 2017-03-22 | End: 2017-04-06 | Stop reason: ALTCHOICE

## 2017-03-22 RX ORDER — CEFAZOLIN SODIUM IN 0.9 % NACL 2 G/50 ML
2 INTRAVENOUS SOLUTION, PIGGYBACK (ML) INTRAVENOUS ONCE
Status: COMPLETED | OUTPATIENT
Start: 2017-03-22 | End: 2017-03-22

## 2017-03-22 RX ORDER — SODIUM CHLORIDE 0.9 % (FLUSH) 0.9 %
5-10 SYRINGE (ML) INJECTION EVERY 8 HOURS
Status: DISCONTINUED | OUTPATIENT
Start: 2017-03-22 | End: 2017-03-22 | Stop reason: HOSPADM

## 2017-03-22 RX ADMIN — LIDOCAINE HYDROCHLORIDE,EPINEPHRINE BITARTRATE 15 MG: 10; .01 INJECTION, SOLUTION INFILTRATION; PERINEURAL at 13:28

## 2017-03-22 RX ADMIN — CEFAZOLIN 2 G: 1 INJECTION, POWDER, FOR SOLUTION INTRAMUSCULAR; INTRAVENOUS; PARENTERAL at 13:25

## 2017-03-22 NOTE — DISCHARGE INSTRUCTIONS
PATIENT INSTRUCTIONS FOR IMPLANTABLE LOOP RECORDER    1. Start antibiotic today and continue for 3 days    2. May shower tomorrow. Gentle wash incision with soap and water. Do not scrub. 3.  Once your bandage and tape is taken off your incision will appear shiny, this is skin glue that will keep your incision closed, please DO NOT attempt to peel this off of incision. Please DO NOT try to scrub the skin glue off once you are able to take a shower. The skin glue will eventually fall off.       5.  Call Dr. Thnah Smith at [phone] if you experience any of the following symptoms:  1. Redness at the loop recorder site  2. Swelling at or around the loop recorder site   3. Pain around the loop recorder  4. Dizziness, lightheadedness, fainting spells  5. Lack of energy  6. Shortness of breath  7. Rapid heart rate  8. Chest or muscle twitches    6. Follow up with Dr Thanh Smtih as scheduled   @Future Appointments  Date Time Provider Johnathon Saxena   4/6/2017 8:00 AM Massimo Britt  [address]   4/12/2017 8:30 AM [facility] MRI 1 [facility] Saima Blayne   6/8/2017 1:40 PM Ruperto De La Torre  [address]   @     7. You may use Extra Strength Tylenol and ice pack for pain relief as needed. Yvette Tierney M.D.  [facility]  Electrophysiology/Cardiology  [facility] [place]  [address], [address]  [facility], [address] [place] [address]  [phone]

## 2017-03-22 NOTE — PROCEDURES
Loop Recorder Implantation    Date of procedure: 3/22/2017    PROCEDURE: Insertion of the implantable loop recorder     HISTORY: This is a 55 y.o. man with IHSS and PVC. There is not enough risk factors for AICD as primary prevention of sudden death. There is concern for ventricular tachycardia. His daughter had cardiac arrest and survived but she has ARVC and not IHSS. He is recommended the implantable loop recorder. Risks and benefits were discussed with patient. Consent was obtained. PROCEDURE IN DETAIL: After informed written consent was obtained, the patient was brought to the Electrophysiology Laboratory where she was prepped and draped in the usual in the usual sterile fashion. Local anesthesia with Xylocaine was given in the left infraclavicular area. A 1 cm incision was made below the left clavicle  The incision was made with a blunt blade and the loop recorder was inserted inside and the pocket was closed with Dermabond   Estimated blood loss: none  COMPLICATIONS: None. IMPLANTED HARDWARE: The loop recorder is Drawn to Scale [id number], serial # [id number]  Specimen removed: NONE  PROGRAMMED PARAMETERS: The programmed parameter is asystole for 3 seconds or more. Bradycardia for less than 40 beats per minute and AFIB/ventricular tachycardia is 160 beats per minute or more.     ASSESSMENT AND PLAN: The patient will be instructed her on how to do [employer] Remote Care link for further follow up

## 2017-03-22 NOTE — H&P (VIEW-ONLY)
Cardiac Electrophysiology Office Consultation Note     Subjective:      Ale Mancuso is a 55 y.o. patient who is seen for evaluation of PVCs on holter  He has had 1000 or so of unifocal superior axis PVCs and they are relatively narrow consistent with inferoseptal origin  He has IHSS and since December he has felt PVCs despite taking bystolic  He has several echos and 2 stress echos  He has IHSS with high LVOT gradient with the most recent 2/2017 over 60 mmHg  On stress echo he had  mmHg but not sure if this is up or down during exercise based on report  He has no syncope or cardiac arrest  He has 5 children, 2 are fraternal twin  One of the twin was in [employer] and had VF arrest in the gym in 2012   She was transferred to [facility] and had S ICD by Dr Bashir Gonsalez  She then has had 2 VF ICD shocks and is now told to go to [facility] as there is concern for ARVC. She was in [place] at the time of third cardiac arrest  Her twin sister and other 3 siblings have been seen by Dr Lizet Melo and pediatric cardiologist and he is told that they are fine   He is avid exercise person    Patient Active Problem List   Diagnosis Code    Abnormal EKG R94.31    Hypertrophic obstructive cardiomyopathy (HOCM) ([facility] [address] I42.1    Family history of cardiac arrest Z82.49    Mitral regurgitation P96.6    Systolic anterior movement of mitral valve I34.8    Hypercholesteremia E78.00     Current Outpatient Prescriptions   Medication Sig Dispense Refill    chlorhexidine (HIBICLENS) 4 % liquid Apply to upper chest each of 5 nights prior to procedure. 1 Bottle 0    nebivolol (BYSTOLIC) 5 mg tablet Take 1 Tab by mouth daily. 90 Tab 3    fluticasone (FLONASE) 50 mcg/actuation nasal spray       Cetirizine (ZYRTEC) 10 mg cap Take  by mouth.  aspirin (ASPIRIN) 325 mg tablet Take 325 mg by mouth daily.  multivitamin (ONE A DAY) tablet Take 1 Tab by mouth daily.        No Known Allergies  Past Medical History: Diagnosis Date    Asymmetric septal hypertrophy [facility])     MRI March 2013 and echo    Family history of cardiac arrest     светлана on treadmill, followed by EP at [facility] Dr Kyra Chun Hypertrophic cardiomyopathy ([facility] [address]     dynamic obstruction during Valsalva in the outflow tract, peak velocity of 3.6 cm/s and a peak gradient of 50 mmHg    Mitral regurgitation     Systolic anterior movement of mitral valve     JUNIE     No past surgical history on file. No family history on file. Social History   Substance Use Topics    Smoking status: Never Smoker    Smokeless tobacco: Not on file    Alcohol use 0.0 oz/week     0 Standard drinks or equivalent per week      Comment: occ        Review of Systems:   Constitutional: Negative for fever, chills, weight loss, malaise/fatigue. HEENT: Negative for nosebleeds, vision changes. Respiratory: Negative for cough, hemoptysis  Cardiovascular: Negative for chest pain, + palpitations, no orthopnea, claudication, leg swelling, syncope, and PND. Gastrointestinal: Negative for nausea, vomiting, diarrhea, blood in stool and melena. Genitourinary: Negative for dysuria, and hematuria. Musculoskeletal: Negative for myalgias, arthralgia. Skin: Negative for rash. Heme: Does not bleed or bruise easily. Neurological: Negative for speech change and focal weakness     Objective:     Visit Vitals    BP 92/64 (BP 1 Location: Left arm, BP Patient Position: Sitting)    Pulse 60    Ht 6' 1\" (1.854 m)    Wt 189 lb (85.7 kg)    BMI 24.94 kg/m2      Physical Exam:   Constitutional: well-developed and well-nourished. No respiratory distress. Head: Normocephalic and atraumatic. Eyes: Pupils are equal, round  ENT: hearing normal  Neck: supple. No JVD present. Cardiovascular: Normal rate, regular rhythm. Exam reveals no gallop and no friction rub. 3/6 systolic LLSB murmur heard. Pulmonary/Chest: Effort normal and breath sounds normal. No wheezes. Abdominal: Soft, no tenderness. Musculoskeletal: no edema. Neurological: alert,oriented. Skin: Skin is warm and dry  Psychiatric: normal mood and affect. Behavior is normal. Judgment and thought content normal.      EKG: NSR with tall T waves      Assessment/Plan:       ICD-10-CM ICD-9-CM    1. PVC (premature ventricular contraction) I49.3 427.69    2. Abnormal EKG R94.31 794.31 MRI CARDIAC MORPH FUNC W WO CONT      CBC WITH AUTOMATED DIFF      METABOLIC PANEL, BASIC   3. Hypertrophic obstructive cardiomyopathy (HOCM) (HCC) I42.1 425.11 MRI CARDIAC MORPH FUNC W WO CONT      CBC WITH AUTOMATED DIFF      METABOLIC PANEL, BASIC   4. Family history of cardiac arrest Z82.49 V17.49 MRI CARDIAC MORPH FUNC W WO CONT      CBC WITH AUTOMATED DIFF      METABOLIC PANEL, BASIC   5. Non-rheumatic mitral regurgitation I34.0 424.0 MRI CARDIAC MORPH FUNC W WO CONT      CBC WITH AUTOMATED DIFF      METABOLIC PANEL, BASIC   6. Systolic anterior movement of mitral valve I34.8 424.0 MRI CARDIAC MORPH FUNC W WO CONT      CBC WITH AUTOMATED DIFF      METABOLIC PANEL, BASIC   7.  Hypercholesteremia E78.00 272.0 MRI CARDIAC MORPH FUNC W WO CONT      CBC WITH AUTOMATED DIFF      METABOLIC PANEL, BASIC     reviewed diet, exercise and weight control  reviewed medications and side effects in detail   I recommend him not to do vigorous exercise for now  He had partial cardiac MRI in 3/2013 with inferoseptum 15 mm and scar  He needs sedation for repeat MRI and will schedule  I agree that he has partial risk factors for sudden death with IHSS and not yet clear cut to recommend ICD but if his daughter does have IHSS and not ARVC based on [facility] evaluation or if he has ILR with VT he meets indication for ICD  MRI data will add more information and indication for ICD with LV thickness and scar burden  His BP is low so cannot increase bystolic dose  He agrees to proceed as he is considered moderate risk for now for sudden cardiac death and we need to risk stratify him    Follow-up Disposition:  Return in about 4 weeks (around 4/7/2017). Thank you for involving me in this patient's care and please call with further concerns or questions. Isadora Chaparro M.D.   Electrophysiology/Cardiology  [facility] [place]  [facility], [address] [place] [address]  [phone]

## 2017-03-22 NOTE — INTERVAL H&P NOTE
H&P Update:  Ale Mancuso was seen and examined. History and physical has been reviewed. The patient has been examined.  There have been no significant clinical changes since the completion of the originally dated History and Physical.    Signed By: Lucina Rivera MD     March 22, 2017 3:29 PM

## 2017-03-22 NOTE — PROGRESS NOTES
Discussed with the patient and all questioned fully answered. He will call me if any problems arise.

## 2017-04-06 ENCOUNTER — OFFICE VISIT (OUTPATIENT)
Dept: CARDIOLOGY CLINIC | Age: 57
End: 2017-04-06

## 2017-04-06 DIAGNOSIS — Z82.49 FAMILY HISTORY OF CARDIAC ARREST: ICD-10-CM

## 2017-04-06 DIAGNOSIS — I42.1 HYPERTROPHIC OBSTRUCTIVE CARDIOMYOPATHY (HOCM) (HCC): ICD-10-CM

## 2017-04-06 DIAGNOSIS — Z95.818 STATUS POST PLACEMENT OF IMPLANTABLE LOOP RECORDER: Primary | ICD-10-CM

## 2017-04-06 NOTE — MR AVS SNAPSHOT
Visit Information Date & Time Provider Department Dept. Phone Encounter #  
 4/6/2017  8:00 AM Romain Farnsworth MD [facility] [phone] [id number] Your Appointments 6/8/2017  1:40 PM  
ESTABLISHED PATIENT with Miguel Ramirez MD  
[facility] ([facility]) Appt Note: 6 month f/u appt per Dr. Darylene Olmstead [address]  
[address] [facility] [address] Upcoming Health Maintenance Date Due Hepatitis C Screening 1960 Pneumococcal 19-64 Medium Risk (1 of 1 - PPSV23) 12/16/1980 DTaP/Tdap/Td series (1 - Tdap) 12/16/1982 FOBT Q 1 YEAR AGE 50-75 12/16/2011 INFLUENZA AGE 9 TO ADULT 8/1/2016 Allergies as of 4/6/2017  Review Complete On: 4/6/2017 By: Sarah Gil LPN No Known Allergies Current Immunizations  Never Reviewed No immunizations on file. Not reviewed this visit You Were Diagnosed With   
  
 Codes Comments Status post placement of implantable loop recorder    -  Primary ICD-10-CM: E34.468 ICD-9-CM: V45.09 Vitals Smoking Status Never Smoker Preferred Pharmacy Pharmacy Name Phone [facility], [address] [phone] Your Updated Medication List  
  
   
This list is accurate as of: 4/6/17  9:10 AM.  Always use your most recent med list.  
  
  
  
  
 aspirin 325 mg tablet Commonly known as:  ASPIRIN Take 325 mg by mouth daily. fluticasone 50 mcg/actuation nasal spray Commonly known as:  FLONASE  
  
 multivitamin tablet Commonly known as:  ONE A DAY Take 1 Tab by mouth daily. nebivolol 5 mg tablet Commonly known as:  BYSTOLIC Take 1 Tab by mouth daily. ZyrTEC 10 mg Cap Generic drug:  Cetirizine Take  by mouth.   
  
  
  
  
To-Do List   
 04/20/2017 7:45 AM  
  Appointment with [facility] MRI 1 at [facility] MRI ([phone]) GENERAL INSTRUCTIONS:  1. You MUST bring a  with you in order to receive sedation. 2. A nurse form MRI/Radiology will call with instructions and arrival time. Please follow their time and instructions only. FASTING GUIDELINES:  NPO Nothing by mouth after midnight unless otherwise instructed by the MRI/Radiology Nurse. Patient Instructions Follow up with Dr. Wander Harrington after your MRI. Introducing \[facility]\"" & [facility]! Dear Claudio Gerardo: 
Thank you for requesting a zoomsquare account. Our records indicate that you already have an active zoomsquare account. You can access your account anytime at [email or web address] Did you know that you can access your hospital and ER discharge instructions at any time in zoomsquare? You can also review all of your test results from your hospital stay or ER visit. Additional Information If you have questions, please visit the Frequently Asked Questions section of the zoomsquare website at [email or web address] Remember, zoomsquare is NOT to be used for urgent needs. For medical emergencies, dial 911. Now available from your iPhone and Android! Please provide this summary of care documentation to your next provider. Your primary care clinician is listed as Gisela Moreno. If you have any questions after today's visit, please call [phone].

## 2017-04-06 NOTE — PROGRESS NOTES
Cardiac Electrophysiology Wound Check Note      Wound Check s/p ILR March 22 for IHSS and PVC. Patient is here for wound check. No fever, drainage   He says the site is very sore still  Physical Exam   Constitutional: well-developed and well-nourished. Skin: Left side is healed without redness, drainage, hematoma. Mild swelling and bruising at left chest.       ASSESSMENT and PLAN     ICD-10-CM ICD-9-CM    1. Status post placement of implantable loop recorder Z95.818 V45.09      The incision is healing without redness, drainage, hematoma.    The patient has finished their anti-biotic  Device check shows proper functions  Reviewed indication for ILR in detail  Remote monitoring is working - no alerts seen    Follow-up Disposition:  6 months

## 2017-04-06 NOTE — PROGRESS NOTES
Chief Complaint   Patient presents with    Wound Check     Verified patient with two types of identifiers. Verified medications with the patient. Verified pharmacy with patient.

## 2017-04-06 NOTE — PROGRESS NOTES
Cardiac Electrophysiology Wound Check Note      Wound Check s/p ILR March 22 for IHSS and PVC. Patient is here for wound check. No fever, drainage   He says the site is very sore still with seat belt  Physical Exam   Constitutional: well-developed and well-nourished. Skin: Left side is healed without redness, drainage, hematoma. Mild bruising at left chest.       ASSESSMENT and PLAN     ICD-10-CM ICD-9-CM    1. Status post placement of implantable loop recorder Z95.818 V45.09    2. Hypertrophic obstructive cardiomyopathy (HOCM) ([facility]) I42.1 425.11    3. Family history of cardiac arrest Z82.49 V17.49      The incision is healing without redness, drainage, hematoma. The patient has finished their anti-biotic  Device check shows proper functions and no VT so far  Reviewed indication for ILR in detail.   His daughter is to be seen by Mary Grubbs next week for ARVD and he is going to have sedated MRI next week for IHSS  Remote monitoring is working - no alerts seen  I explained again about AICD indication for IHSS and if he does not like ILR size it is going to be tough with AICD    Follow-up Disposition: after the tests

## 2017-04-25 ENCOUNTER — OFFICE VISIT (OUTPATIENT)
Dept: CARDIOLOGY CLINIC | Age: 57
End: 2017-04-25

## 2017-04-25 DIAGNOSIS — I49.3 SYMPTOMATIC PVCS: Primary | ICD-10-CM

## 2017-04-25 DIAGNOSIS — I42.1 HYPERTROPHIC OBSTRUCTIVE CARDIOMYOPATHY (HOCM) (HCC): ICD-10-CM

## 2017-05-24 ENCOUNTER — HOSPITAL ENCOUNTER (OUTPATIENT)
Dept: MRI IMAGING | Age: 57
Discharge: HOME | End: 2017-05-24
Attending: INTERNAL MEDICINE
Payer: COMMERCIAL

## 2017-05-24 VITALS
BODY MASS INDEX: 23.12 KG/M2 | WEIGHT: 185 LBS | SYSTOLIC BLOOD PRESSURE: 123 MMHG | RESPIRATION RATE: 15 BRPM | HEART RATE: 62 BPM | DIASTOLIC BLOOD PRESSURE: 56 MMHG | OXYGEN SATURATION: 99 %

## 2017-05-24 DIAGNOSIS — I34.89 SYSTOLIC ANTERIOR MOVEMENT OF MITRAL VALVE: ICD-10-CM

## 2017-05-24 DIAGNOSIS — I42.1 HYPERTROPHIC OBSTRUCTIVE CARDIOMYOPATHY (HOCM) (HCC): ICD-10-CM

## 2017-05-24 DIAGNOSIS — R94.31 ABNORMAL EKG: ICD-10-CM

## 2017-05-24 DIAGNOSIS — Z82.49 FAMILY HISTORY OF CARDIAC ARREST: ICD-10-CM

## 2017-05-24 DIAGNOSIS — I34.0 NON-RHEUMATIC MITRAL REGURGITATION: ICD-10-CM

## 2017-05-24 DIAGNOSIS — E78.00 HYPERCHOLESTEREMIA: ICD-10-CM

## 2017-05-24 PROCEDURE — A9579 GAD-BASE MR CONTRAST NOS,1ML: HCPCS | Performed by: INTERNAL MEDICINE

## 2017-05-24 PROCEDURE — 99153 MOD SED SAME PHYS/QHP EA: CPT

## 2017-05-24 PROCEDURE — 75561 CARDIAC MRI FOR MORPH W/DYE: CPT

## 2017-05-24 PROCEDURE — 74011636320 HC RX REV CODE- 636/320: Performed by: INTERNAL MEDICINE

## 2017-05-24 PROCEDURE — 99152 MOD SED SAME PHYS/QHP 5/>YRS: CPT

## 2017-05-24 PROCEDURE — 74011250636 HC RX REV CODE- 250/636: Performed by: INTERNAL MEDICINE

## 2017-05-24 RX ORDER — FENTANYL CITRATE 50 UG/ML
100 INJECTION, SOLUTION INTRAMUSCULAR; INTRAVENOUS
Status: DISCONTINUED | OUTPATIENT
Start: 2017-05-24 | End: 2017-05-24

## 2017-05-24 RX ORDER — MIDAZOLAM HYDROCHLORIDE 1 MG/ML
5 INJECTION, SOLUTION INTRAMUSCULAR; INTRAVENOUS
Status: DISCONTINUED | OUTPATIENT
Start: 2017-05-24 | End: 2017-05-24

## 2017-05-24 RX ADMIN — GADOPENTETATE DIMEGLUMINE 34 ML: 469.01 INJECTION INTRAVENOUS at 09:52

## 2017-05-24 RX ADMIN — MIDAZOLAM HYDROCHLORIDE 1 MG: 1 INJECTION, SOLUTION INTRAMUSCULAR; INTRAVENOUS at 09:08

## 2017-05-24 RX ADMIN — MIDAZOLAM HYDROCHLORIDE 1 MG: 1 INJECTION, SOLUTION INTRAMUSCULAR; INTRAVENOUS at 09:15

## 2017-05-24 NOTE — PROGRESS NOTES
0820 patient brought to rad holding, ambulatory with steady gait, ID verified, no allergies, patient changed into gown, vitals taken, 22g IV placed in right AC without difficulty, no labs needed. Lungs clear on auscultation. Patient has implanted loop recorder in left upper chest.  Consent obtained     0905 Dr. Pena Her to bedside for consent    [id number]  patient on table   0910 ttime out  0920 procedure began  1008 procedure complete, patient tolerated well, total meds rreceived 2mg Versed as directed by Dr. Pena Her    21  patient returned to rad holding, placed on monitor x3, family updated on patient status. Discharge instructions printed and reviewed with patient. Patient taken via wheelchair to awaiting transportation home.

## 2017-05-24 NOTE — DISCHARGE INSTRUCTIONS
MRI Adult Sedation Discharge Instructions      Procedure Performed: Cardiac MRI    Medications Given:   Versed 2.5mg    Common side effects associated with each of these medications includes:  · Drowsiness, dizziness, euphoria, sleepiness or confusion  · Impaired memory recall  · Unsteady gait, loss of fine muscle control and delayed reaction time  · Visual disturbances, difficulty focusing, blurred vision    You may experience some of these side effects or you may not be aware of subtle changes in your behavior or reaction time. Because you received these medications, we are giving you the following instructions. Discharge Instructions:  · Do not consume alcoholic beverages for a minimum of 24 hours  · Do not make important personal, legal or business decisions for 24 hours  · Move slowly and carefully, do not make sudden position changes. Be alert for dizziness or lightheadedness and move accordingly. Have a responsible person assist you. · Do not drive for 24 hours  · Do not operate equipment for 24 hours - [employer], power tools, Kitchen accessories: stove, etc.    Diet/Diet Restrictions: Advance your diet as tolerated    Pain Management:   Avoid narcotic pain medications today till after 3pm to avoid interaction with sedation you received. Thank you for allowing us to care for your today. If you should have any questions about your procedure or follow up care, please contact a member of your radiology team at [phone] or if after hours, please call [phone]. If you feel you are having a life threatening emergency, call [phone]. If you report to an emergency room, doctors office or hospital within 24 hours, BRING THIS [address] and give it to the nurse or physician attending to you.

## 2017-05-24 NOTE — IP AVS SNAPSHOT
[address] 
[phone] Patient: Luanne Loya MRN: [id number] :1960 You are allergic to the following No active allergies Recent Documentation Weight BMI Smoking Status 83.9 kg 23.12 kg/m2 Never Smoker Emergency Contacts Name Discharge Info Relation Home Work Mobile Taniya Guo  Spouse [3] [phone] About your hospitalization You were admitted on:  May 24, 2017 You last received care in the:  [address] You were discharged on:  May 24, 2017 Unit phone number:  [phone] Why you were hospitalized Your primary diagnosis was:  Not on File Providers Seen During Your Hospitalizations Provider Role Specialty Primary office phone Светлана Sarmiento MD Attending Provider Cardiology [phone] Your Primary Care Physician (PCP) Primary Care Physician Office Phone Office Fax Mao Axjocelin [phone] Follow-up Information None Your Appointments   1:40 PM EDT  
ESTABLISHED PATIENT with Dimitry De León MD  
[facility] (Lev Velasquez) [address] [place] [address]  [address] [place] [address]  
[phone] Current Discharge Medication List  
  
ASK your doctor about these medications Dose & Instructions Dispensing Information Comments Morning Noon Evening Bedtime  
 aspirin 325 mg tablet Commonly known as:  ASPIRIN Your last dose was: Your next dose is:    
   
   
 Dose:  325 mg Take 325 mg by mouth daily. Refills:  0  
     
   
   
   
  
 fluticasone 50 mcg/actuation nasal spray Commonly known as:  [place] Mad Your last dose was: Your next dose is:    
   
   
  Refills:  0  
     
   
   
   
  
 multivitamin tablet Commonly known as:  ONE A DAY  
 Your last dose was: Your next dose is:    
   
   
 Dose:  1 Tab Take 1 Tab by mouth daily. Refills:  0  
     
   
   
   
  
 nebivolol 5 mg tablet Commonly known as:  BYSTOLIC Your last dose was: Your next dose is:    
   
   
 Dose:  5 mg Take 1 Tab by mouth daily. Quantity:  90 Tab Refills:  3 ZyrTEC 10 mg Cap Generic drug:  Cetirizine Your last dose was: Your next dose is: Take  by mouth. Refills:  0 Discharge Instructions MRI Adult Sedation Discharge Instructions Procedure Performed: Cardiac MRI Medications Given:  
Versed 2.5mg 
 
Common side effects associated with each of these medications includes: · Drowsiness, dizziness, euphoria, sleepiness or confusion · Impaired memory recall · Unsteady gait, loss of fine muscle control and delayed reaction time · Visual disturbances, difficulty focusing, blurred vision You may experience some of these side effects or you may not be aware of subtle changes in your behavior or reaction time. Because you received these medications, we are giving you the following instructions. Discharge Instructions: · Do not consume alcoholic beverages for a minimum of 24 hours · Do not make important personal, legal or business decisions for 24 hours · Move slowly and carefully, do not make sudden position changes. Be alert for dizziness or lightheadedness and move accordingly. Have a responsible person assist you. · Do not drive for 24 hours · Do not operate equipment for 24 hours - [employer], power tools, Kitchen accessories: stove, etc. 
 
Diet/Diet Restrictions: Advance your diet as tolerated Pain Management:  
Avoid narcotic pain medications today till after 3pm to avoid interaction with sedation you received. Thank you for allowing us to care for your today.   If you should have any questions about your procedure or follow up care, please contact a member of your radiology team at [phone] or if after hours, please call [phone]. If you feel you are having a life threatening emergency, call 911. If you report to an emergency room, doctors office or hospital within 24 hours, BRING THIS [address] and give it to the nurse or physician attending to you. Discharge Orders None Introducing [address]! Dear Elizabeth Estrada: 
Thank you for requesting a Goomeo account. Our records indicate that you already have an active Goomeo account. You can access your account anytime at [email or web address] - teamwork across global offices. [email or web address] - teamwork across global offices Did you know that you can access your hospital and ER discharge instructions at any time in Goomeo? You can also review all of your test results from your hospital stay or ER visit. Additional Information If you have questions, please visit the Frequently Asked Questions section of the Goomeo website at [email or web address] - teamwork across global offices. [email or web address] - teamwork across global offices/. Remember, Goomeo is NOT to be used for urgent needs. For medical emergencies, dial 911. Now available from your iPhone and Android! General Information Please provide this summary of care documentation to your next provider. Patient Signature:  ____________________________________________________________ Date:  ____________________________________________________________  
  
Gris Lewis Provider Signature:  ____________________________________________________________ Date:  ____________________________________________________________

## 2017-05-25 NOTE — PROGRESS NOTES
Cardiac MRI result showed moderate hypertrophy but not to degree that ICD can be indicated  Interestingly there is no scar  Dr Amina Augustin discussed LVOT obstruction with me  He had stress echo in 2015 with high gradient and moderate MR  He prefers to go back to his routine exercise  I recommend stress echo with attention to BP respond and LVOT gradient with exercise before we can let him to return to his exercise routine and this could also help risk stratify ICD indication  He has upcoming appointment with Dr Airam Peoples and if ok with scheduling he may get that done same day    Still need record of evaluation of ARVC for his daughter from [facility] if he can provide    6/8/2017   1:40 PM    Michelle Correa MD        [place] [address]

## 2017-05-25 NOTE — PROGRESS NOTES
Cardiac MRI result showed moderate hypertrophy but not to degree that ICD can be indicated  Interestingly there is no scar  Dr Levi Spencer discussed LVOT obstruction with me  He had stress echo in 2015 with high gradient and moderate MR  He prefers to go back to his routine exercise  I recommend stress echo with attention to BP respond and LVOT gradient with exercise before we can let him to return to his exercise routine and this could also help risk stratify ICD indication  He has upcoming appointment with Dr Gloria Cuenca and if ok with scheduling he may get that done same day    Still need record of evaluation of ARVC for his daughter from [facility] if he can provide    6/8/2017   1:40 PM    Unknown MD Princess        [place] [address]

## 2017-05-30 ENCOUNTER — TELEPHONE (OUTPATIENT)
Dept: CARDIOLOGY CLINIC | Age: 57
End: 2017-05-30

## 2017-05-30 NOTE — TELEPHONE ENCOUNTER
----- Message from Keegan Son MD sent at 5/24/2017 10:16 PM EDT -----  Cardiac MRI result showed moderate hypertrophy but not to degree that ICD can be indicated  Interestingly there is no scar  Dr Silvia Shaffer discussed LVOT obstruction with me  He had stress echo in 2015 with high gradient and moderate MR  He prefers to go back to his routine exercise  I recommend stress echo with attention to BP respond and LVOT gradient with exercise before we can let him to return to his exercise routine and this could also help risk stratify ICD indication  He has upcoming appointment with Dr Sol Santos and if ok with scheduling he may get that done same day    Still need record of evaluation of ARVC for his daughter from [facility] if he can provide    6/8/2017   1:40 PM    Zarina Urena MD        [place] [address]

## 2017-06-02 NOTE — TELEPHONE ENCOUNTER
Verified patient with two types of identifiers. Notified patient of results. Answered questions regarding MRI results. He has pending appt with Dr Ruthann Dela Cruz 6/8/17 and will discuss having the stress echo prior to scheduling with him. Patient verbalizes understandng. And will call with any questions or concerns. He states that him at his family were all evaluated by Dr Nick Puente and they do not have ARVS so Dr Rene Wagoner does not need the records on his daughter.

## 2017-06-13 ENCOUNTER — OFFICE VISIT (OUTPATIENT)
Dept: CARDIOLOGY CLINIC | Age: 57
End: 2017-06-13

## 2017-06-13 VITALS
HEART RATE: 68 BPM | DIASTOLIC BLOOD PRESSURE: 70 MMHG | BODY MASS INDEX: 24.67 KG/M2 | RESPIRATION RATE: 16 BRPM | WEIGHT: 198.4 LBS | SYSTOLIC BLOOD PRESSURE: 118 MMHG | OXYGEN SATURATION: 98 % | HEIGHT: 75 IN

## 2017-06-13 DIAGNOSIS — I42.1 HYPERTROPHIC OBSTRUCTIVE CARDIOMYOPATHY (HOCM) (HCC): ICD-10-CM

## 2017-06-13 DIAGNOSIS — Z82.49 FAMILY HISTORY OF CARDIAC ARREST: ICD-10-CM

## 2017-06-13 DIAGNOSIS — I42.1 IHSS (IDIOPATHIC HYPERTROPHIC SUBAORTIC STENOSIS) (HCC): Primary | ICD-10-CM

## 2017-06-13 DIAGNOSIS — E78.00 HYPERCHOLESTEREMIA: ICD-10-CM

## 2017-06-13 DIAGNOSIS — I34.0 NON-RHEUMATIC MITRAL REGURGITATION: ICD-10-CM

## 2017-06-13 DIAGNOSIS — I49.3 SYMPTOMATIC PVCS: ICD-10-CM

## 2017-06-13 DIAGNOSIS — I34.89 SYSTOLIC ANTERIOR MOVEMENT OF MITRAL VALVE: ICD-10-CM

## 2017-06-13 NOTE — MR AVS SNAPSHOT
Visit Information Date & Time Provider Department Dept. Phone Encounter #  
 6/13/2017  8:20 AM Miguel Ramirez MD [facility] [phone] [id number] Your Appointments 7/18/2017 10:00 AM  
STRESS ECHOCARDIOGRAMS with JAS SUBRAMANIAN [facility] ([facility] SCHEDULING) Appt Note: stress echo  dx ihss look at lv flow track per dr Maria Del Carmen Goldsmith [address]  
[phone]  
  
   
 [address]  [address] [address]  
  
    
 7/18/2017 10:00 AM  
ECHO CARDIOGRAMS 2D with Carmen Conn [facility] (ELIZABETH SCHEDULING) Appt Note: stress echo  dx ihss look at lv flow track per dr Maria Del Carmen Goldsmith [address]  
[address]  
  
    
 10/12/2017  9:00 AM  
ESTABLISHED PATIENT with Miguel Ramirez MD  
[facility] ([address]) Appt Note: 4 month f/u after testing [address]  [address] [place] [address]  
[address] [address] Upcoming Health Maintenance Date Due Hepatitis C Screening 1960 Pneumococcal 19-64 Medium Risk (1 of 1 - PPSV23) 12/16/1980 DTaP/Tdap/Td series (1 - Tdap) 12/16/1982 FOBT Q 1 YEAR AGE 50-75 12/16/2011 INFLUENZA AGE 9 TO ADULT 8/1/2017 Allergies as of 6/13/2017  Review Complete On: 6/13/2017 By: Miguel Ramirez MD  
 No Known Allergies Current Immunizations  Never Reviewed No immunizations on file. Not reviewed this visit You Were Diagnosed With   
  
 Codes Comments IHSS (idiopathic hypertrophic subaortic stenosis) (HCC)    -  Primary ICD-10-CM: I42.1 ICD-9-CM: 425.11 Hypertrophic obstructive cardiomyopathy (HOCM) (HCC)     ICD-10-CM: I42.1 ICD-9-CM: 425.11   
 Non-rheumatic mitral regurgitation     ICD-10-CM: I34.0 ICD-9-CM: 424.0 Systolic anterior movement of mitral valve     ICD-10-CM: I34.8 ICD-9-CM: 424.0 Symptomatic PVCs     ICD-10-CM: I49.3 ICD-9-CM: 427.69 Hypercholesteremia     ICD-10-CM: E78.00 ICD-9-CM: 272.0 Family history of cardiac arrest     ICD-10-CM: Z82.49 
ICD-9-CM: V17.49 Vitals BP Pulse Resp Height(growth percentile) Weight(growth percentile) SpO2  
 118/70 (BP 1 Location: Left arm, BP Patient Position: Sitting) 68 16 6' 3\" (1.905 m) 198 lb 6.4 oz (90 kg) 98% BMI Smoking Status 24.8 kg/m2 Never Smoker Vitals History BMI and BSA Data Body Mass Index Body Surface Area  
 24.8 kg/m 2 2.18 m 2 Preferred Pharmacy Pharmacy Name Phone [phone] [phone] Your Updated Medication List  
  
   
This list is accurate as of: 6/13/17 11:09 AM.  Always use your most recent med list.  
  
  
  
  
 aspirin 325 mg tablet Commonly known as:  ASPIRIN Take 325 mg by mouth daily. fluticasone 50 mcg/actuation nasal spray Commonly known as:  FLONASE  
  
 multivitamin tablet Commonly known as:  ONE A DAY Take 1 Tab by mouth daily. nebivolol 5 mg tablet Commonly known as:  BYSTOLIC Take 1 Tab by mouth daily. ZyrTEC 10 mg Cap Generic drug:  Cetirizine Take  by mouth. To-Do List   
 06/13/2017 ECHO:  ECHO TTE STRESS EXRCSE COMP W OR WO CONTR Patient Instructions 4 month follow up [facility]! Dear Ismael Corbin: 
Thank you for requesting a OneRiot account. Our records indicate that you already have an active OneRiot account. You can access your account anytime at [email or web address] Did you know that you can access your hospital and ER discharge instructions at any time in [facility]? You can also review all of your test results from your hospital stay or ER visit. Additional Information If you have questions, please visit the Frequently Asked Questions section of the [facility] website at [email or web address] Remember, [facility] is NOT to be used for urgent needs. For medical emergencies, dial 911. Now available from your iPhone and Android! Please provide this summary of care documentation to your next provider. Your primary care clinician is listed as Lizet Shea. If you have any questions after today's visit, please call [phone].

## 2017-06-13 NOTE — PROGRESS NOTES
Martha Macedo     1960       Grant Sandoval MD, [facility]  Date of Visit-6/13/2017   PCP is Arleen Milian MD   [address] [place] [address] [facility] [place]  [facility]  HPI:  Martha Macedo is a 55 y.o. male     Subjective:  Mr. Alyssa Moody comes back to see us today to go over testing. He has a history of IHSS. He had PVCs through the month of December, basically mainly in the night. His symptoms improved after he really cut back on alcohol, caffeine and salt, eliminated them almost completely. Since then he's only had PVCs one time when he was at a barbecue and had a little bit of alcohol and salty food. He saw Dr. Nenita Miner, who recommended no significant changes. His daughter, who had sudden death with revival, has now had three more shocks from her defibrillator. She is followed at [facility] and she has ARVCD. His family has been tested and they do not have IHSS or ARVCD. His symptoms have gone away in terms of the PVCs. We had ordered and MRI and Holter and an ILR. The ILR has shown no ventricular tachycardia. The Holter showed persistent PVCs. The MRI is complete, which confirms IHSS with no scar. Wall thickness on the inferior septal wall is 19 mm and the basal anterior septal wall is 16 mm. There is no resting outflow tract gradient. There is an elongated mitral valve leaflet causing some resting outflow tract gradient with JUNIE of the anterior mitral valve leaflet and 1-2+ MR. There was no myocardial scar. On physical exam today he has no murmur that I can appreciate. His ILR through April 21st, which is the last report I have, is within normal limits. His previous EKG in January showed on preexcitation. He denies chest pain, chest pressure, shortness of breath, PND, orthopnea, claudication or syncope. Assessment/Plan:       1. IHSS, confirmed now by MRI with normal LV function and he has some JUNIE of the mitral valve anterior leaflet noted.   2. Has had discussion with Dr. Papi Howard. He does not feel defibrillator is indicated at this time, especially with the lack of scar. 3. The next question is exercise. We had a long discussion about that. He really is not engaging in competitive exercise anyway, really just doing some walking. Goes up a hill, feels like his heart gets to 130-140 at about top of that hill and does about four laps of that, but he's not interested in really doing competitive running or competitive sports otherwise. Nonetheless, it is reasonable to proceed with a stress echocardiogram to see if he has any change in outflow tract gradient of significance when he exercises. Will arrange for stress echo with LV outflow tract measurement with exercise. 4. JUNIE MR, clinically no heart failure. 5. Family history of cardiac arrest.  As discussed, his daughter is now taken care of at [facility] and [facility], has undergone multiple ablations for her, but he does not have that, nor does her other siblings. 6. PVCs, previously symptomatic, but with elimination of alcohol, salt and keeping well hydrated he seems to have done very well now. 7. Follow up with me in four months, obtain results of stress echo. 8. Extensive complete discussion. I went over the MRI results, Holter results, previous echocardiograms and previous history from 2012 up till now. Future Appointments  Date Time Provider Johnathon Saxena   7/18/2017 10:00 AM ECHOTWO, [address]   7/18/2017 10:00 AM STRESSECHO, [address]   10/12/2017 9:00 AM MD SAMARA Guzman SCHED       Impression:   1. IHSS (idiopathic hypertrophic subaortic stenosis) ([address]    2. Hypertrophic obstructive cardiomyopathy (HOCM) ([address]    3. Non-rheumatic mitral regurgitation    4. Systolic anterior movement of mitral valve    5. Symptomatic PVCs    6. Hypercholesteremia    7.  Family history of cardiac arrest       Cardiac History:   Echo 2-23-16=dynamic obstruction during Valslava in the  outflow tract with a peak velocity of 3.8 cm/s and a peak gradient of 60  mmHg. There was dynamic obstruction at rest in the outflow tract with a  peak velocity 3.3 m/s and a peak gradient of 45 mmHg. Systolic function  was normal. Ejection fraction  55 % to 65 %. Wall thickness was  mildly to moderately increased  Mitral valve:  mild regurgitation. Holter Monitor, 2012: PVCs , short run of bigeminy but no VT.  ECHO 12 LVEF of 75 % severe asymmetric hypertrophy of the septum. dynamic obstruction during Valsalva in the outflow tract, peak velocity of 3.6 cm/s and a peak gradient of 50 mmHg. IVS= 17 mm Pw= 9 mm.   mild mitral valve regurgitation systolic anterior motion (JUNIE)of the chordal structures. JENNIFER g no evidence of ischemia with the patient walking 9 minutes on the David Protocol and achieving 94% of MPHR. MRI which was terminated early on 13 due to a claustrophobic reaction demonstrated an LVEF of 76% with mild septal hypertrophy. There was a very steep aorto-septal angle of 107 degrees due to the sigmoid septum and the steep angulation of the aortic root creating an unfavorable mitral valve apparatus apposition with the septum leading to significant JUNIE of the chordae and left ventricular outflow tract obstruction. Social History: Nonsmoker. Two to three beers per day. Works as an , has five children. He is  and does a lot of activities with his children. He does some foreign travel though, mainly [place], once to [place]. He has had no illnesses there. maternal uncle with a history of CABG and MI who  at age 46 and a maternal grandfather with known coronary disease and \"heart attacks\" who  at age 50. Daughter had sudden collapse, cardiac arrest with exercising, seen at [facility]. ROS-except as noted above. . A complete cardiac and respiratory are reviewed and negative except as above ; Resp-denies wheezing or productive cough,. Const- No unusual weight loss or fever; Neuro-no recent seizure or CVA ; GI- No BRBPR, abdom pain, bloating ; - no  hematuria   see supplement sheet, initialed and to be scanned by staff  Past Medical History:   Diagnosis Date    Asymmetric septal hypertrophy ([facility]     MRI March 2013 and echo    Family history of cardiac arrest     luiser on treadmill, followed by EP at [facility] Dr Walter Brown Hypertrophic cardiomyopathy ([facility]     dynamic obstruction during Valsalva in the outflow tract, peak velocity of 3.6 cm/s and a peak gradient of 50 mmHg    Mitral regurgitation     Systolic anterior movement of mitral valve     JUNIE      Social Hx= reports that he has never smoked. He does not have any smokeless tobacco history on file. He reports that he drinks alcohol. Exam and Labs:    Visit Vitals    /70 (BP 1 Location: Left arm, BP Patient Position: Sitting)    Pulse 68    Resp 16    Ht 6' 3\" (1.905 m)    Wt 198 lb 6.4 oz (90 kg)    SpO2 98%    BMI 24.8 kg/m2      Constitutional:  NAD, comfortable  Head: NC,AT. Eyes: No scleral icterus. Neck:  Neck supple. No JVD present. Throat: moist mucous membranes. Chest: Effort normal & normal respiratory excursion . Neurological: alert, conversant and oriented . Skin: Skin is not cold. No obvious systemic rash noted. Not diaphoretic. No erythema. Psychiatric:  Grossly normal mood and affect. Behavior appears normal. Extremities:  no clubbing or cyanosis. Abdomen: non distended    Lungs:breath sounds normal. No stridor. distress, wheezes or  Rales. Heart:    normal rate, regular rhythm, normal S1, S2, no murmurs, rubs, clicks or gallops , PMI non displaced. Edema: Edema is none.   No results found for: CHOL, CHOLX, CHLST, CHOLV, HDL, LDL, DLDL, LDLC, DLDLP, TGLX, TRIGL, TRIGP, CHHD, CHHDX  Lab Results   Component Value Date/Time    Sodium 140 03/17/2017 11:49 AM    Potassium 4.3 03/17/2017 11:49 AM    Chloride 97 03/17/2017 11:49 AM    CO2 24 03/17/2017 11:49 AM    Glucose 99 03/17/2017 11:49 AM    BUN 20 03/17/2017 11:49 AM    Creatinine 0.74 03/17/2017 11:49 AM    BUN/Creatinine ratio 27 03/17/2017 11:49 AM    GFR est  03/17/2017 11:49 AM    GFR est non- 03/17/2017 11:49 AM    Calcium 10.0 03/17/2017 11:49 AM      Wt Readings from Last 3 Encounters:   06/13/17 198 lb 6.4 oz (90 kg)   05/24/17 185 lb (83.9 kg)   03/22/17 185 lb (83.9 kg)      BP Readings from Last 3 Encounters:   06/13/17 118/70   05/24/17 123/56   03/22/17 127/69      Current Outpatient Prescriptions   Medication Sig    nebivolol (BYSTOLIC) 5 mg tablet Take 1 Tab by mouth daily.  fluticasone (FLONASE) 50 mcg/actuation nasal spray     Cetirizine (ZYRTEC) 10 mg cap Take  by mouth.  aspirin (ASPIRIN) 325 mg tablet Take 325 mg by mouth daily.  multivitamin (ONE A DAY) tablet Take 1 Tab by mouth daily. No current facility-administered medications for this visit. Impression see above.

## 2017-06-21 ENCOUNTER — OFFICE VISIT (OUTPATIENT)
Dept: CARDIOLOGY CLINIC | Age: 57
End: 2017-06-21

## 2017-06-21 DIAGNOSIS — Z95.818 STATUS POST PLACEMENT OF IMPLANTABLE LOOP RECORDER: Primary | ICD-10-CM

## 2017-07-05 ENCOUNTER — OFFICE VISIT (OUTPATIENT)
Dept: CARDIOLOGY CLINIC | Age: 57
End: 2017-07-05

## 2017-07-05 ENCOUNTER — DOCUMENTATION ONLY (OUTPATIENT)
Dept: CARDIOLOGY CLINIC | Age: 57
End: 2017-07-05

## 2017-07-05 DIAGNOSIS — Z95.818 STATUS POST PLACEMENT OF IMPLANTABLE LOOP RECORDER: Primary | ICD-10-CM

## 2017-07-05 DIAGNOSIS — I49.3 SYMPTOMATIC PVCS: ICD-10-CM

## 2017-07-05 NOTE — PROGRESS NOTES
ILR showed possible atrial flutter with RVR and aberrancy, more likely than VT  Continue to monitor  Future Appointments  Date Time Provider Johnathon Saxena   7/18/2017 10:00 AM ECHOTWO, [address]   7/18/2017 10:00 AM STRESSECHO, [address]   10/12/2017 9:00 AM Cecilio Waldrop  [address]

## 2017-07-18 ENCOUNTER — CLINICAL SUPPORT (OUTPATIENT)
Dept: CARDIOLOGY CLINIC | Age: 57
End: 2017-07-18

## 2017-07-18 DIAGNOSIS — R94.31 ABNORMAL EKG: ICD-10-CM

## 2017-07-18 DIAGNOSIS — I42.1 IHSS (IDIOPATHIC HYPERTROPHIC SUBAORTIC STENOSIS) (HCC): ICD-10-CM

## 2017-07-22 NOTE — PROGRESS NOTES
Let pt know his stress echo shows no ischemia  It does show that the gradient rises so Dr Panchal Petties and I would recommend he avoid competative sports but he can do general exercise  He can go to the gym but avoid full court basketball, more than one set of tennis , or a 10K for example

## 2017-07-24 ENCOUNTER — TELEPHONE (OUTPATIENT)
Dept: CARDIOLOGY CLINIC | Age: 57
End: 2017-07-24

## 2017-07-24 NOTE — TELEPHONE ENCOUNTER
----- Message from Sarah Lino MD sent at 7/22/2017  5:08 AM EDT -----  Let pt know his stress echo shows no ischemia  It does show that the gradient rises so Dr Bernard Dodge and I would recommend he avoid competative sports but he can do general exercise  He can go to the gym but avoid full court basketball, more than one set of tennis , or a 10K for example

## 2017-07-25 NOTE — TELEPHONE ENCOUNTER
Spoke to pt id x2  Doing well  Just wanted some detail  Has HR monitor , gets to 140 up hill does 2 minute sprint  Advised to keep HR in 110-120 with peak at 135 then back right off and slow pace   No 10k or full court bball or full set tennis  He has understanding and agrees to follow this advice  Explained risk for pass out, death if gradient gets high , went over physiology    Future Appointments  Date Time Provider Johnathon Saxena   10/12/2017 9:00 AM Patrick Ferraro  [address]

## 2017-08-22 ENCOUNTER — OFFICE VISIT (OUTPATIENT)
Dept: CARDIOLOGY CLINIC | Age: 57
End: 2017-08-22

## 2017-08-22 DIAGNOSIS — I49.3 SYMPTOMATIC PVCS: Primary | ICD-10-CM

## 2017-08-22 DIAGNOSIS — Z95.818 STATUS POST PLACEMENT OF IMPLANTABLE LOOP RECORDER: ICD-10-CM

## 2017-09-26 ENCOUNTER — OFFICE VISIT (OUTPATIENT)
Dept: CARDIOLOGY CLINIC | Age: 57
End: 2017-09-26

## 2017-09-26 DIAGNOSIS — I49.3 SYMPTOMATIC PVCS: Primary | ICD-10-CM

## 2017-09-26 DIAGNOSIS — Z95.818 STATUS POST PLACEMENT OF IMPLANTABLE LOOP RECORDER: ICD-10-CM

## 2017-10-12 ENCOUNTER — OFFICE VISIT (OUTPATIENT)
Dept: CARDIOLOGY CLINIC | Age: 57
End: 2017-10-12

## 2017-10-12 ENCOUNTER — CLINICAL SUPPORT (OUTPATIENT)
Dept: CARDIOLOGY CLINIC | Age: 57
End: 2017-10-12

## 2017-10-12 VITALS
HEIGHT: 75 IN | SYSTOLIC BLOOD PRESSURE: 132 MMHG | WEIGHT: 191 LBS | OXYGEN SATURATION: 99 % | RESPIRATION RATE: 18 BRPM | DIASTOLIC BLOOD PRESSURE: 80 MMHG | HEART RATE: 72 BPM | BODY MASS INDEX: 23.75 KG/M2

## 2017-10-12 DIAGNOSIS — E78.00 HYPERCHOLESTEREMIA: ICD-10-CM

## 2017-10-12 DIAGNOSIS — I49.3 SYMPTOMATIC PVCS: Primary | ICD-10-CM

## 2017-10-12 DIAGNOSIS — Z82.49 FAMILY HISTORY OF CARDIAC ARREST: ICD-10-CM

## 2017-10-12 DIAGNOSIS — I42.1 HYPERTROPHIC OBSTRUCTIVE CARDIOMYOPATHY (HOCM) (HCC): Primary | ICD-10-CM

## 2017-10-12 DIAGNOSIS — R94.31 ABNORMAL EKG: ICD-10-CM

## 2017-10-12 DIAGNOSIS — I48.0 PAF (PAROXYSMAL ATRIAL FIBRILLATION) (HCC): ICD-10-CM

## 2017-10-12 DIAGNOSIS — Z95.818 STATUS POST PLACEMENT OF IMPLANTABLE LOOP RECORDER: ICD-10-CM

## 2017-10-12 DIAGNOSIS — I49.3 SYMPTOMATIC PVCS: ICD-10-CM

## 2017-10-12 NOTE — PROGRESS NOTES
Bert Zavala     1960       Grant Jim MD, [facility]  Date of Visit-10/12/2017   PCP is La Tapia MD   [facility] [place]  [facility]  HPI:  Bert Zavala is a 55 y.o. male   Has a hx of IHSS with PVCs. His daughter has had sudden death with revival and may have ARVCD. His family has been tested and no other members of the family have [address] or [address] We had Dr. Shae Hardy see Mr Chris Loco and an IRL placed with thus far no arrhythmia. The MRI confirmed IHSS with no scarring, wall thickness inferior 19 mm basal anterior septum 16 mm. There was an elongated MV leaflet causing mild JUNIE, based on this we recommended that he engage in non competitive athletics. Device checked today: no arrhythmia. Previous IRL checks have shown 1 episode of afib on 7/4/17 at 16 minutes. Stress test 7/18/17 he walked for 11 minutes to a peak HR of 152 which is 92%. Overall the pt states they are doing well. He reports that he is exercising moderately everyday and is doing well with this. He notes that he has no chest pain but every now and then feels the loop recorder doing everyday activities. The pt reports that his family is doing well. He notes that his daughter recently underwent sympathetectomy and has been doing well since that time. He states that he can notice when his heart rate is slightly elevated and associates this increased heart rate to what he eats and drinks that day. Denies chest pain, edema, syncope or shortness of breath at rest, has no tachycardia, palpitations or sense of arrhythmia. Assessment/Plan:     1. IHSS   ----confirmed by MRI with normal LV function and has JUNIE with lack of scar Dr. Shae Hardy and I feel no defibrillator is needed.  ==== Pt avoids competitive sports and IRL has shown no ventricular arrhythmia. ------Stress ECHO on July 18 is reviewed. 2. PAF:   -----had a short run in July, none since then, stable. Continue IRL monitoring.    No [facility] planned as low risk  3. PVCs   ----now asymptomatic. 4. Fam HX of cardiac arrest:  ---- daughter has gotten a sympathetectomy and doing well.  5. F/u in 6 months. Future Appointments  Date Time Provider Johnathon Odessa   4/17/2018 8:20 AM MD SAMARA Boland SCHED       Impression:   1. Hypertrophic obstructive cardiomyopathy (HOCM) ([address]    2. PAF (paroxysmal atrial fibrillation) ([address]    3. Symptomatic PVCs    4. Abnormal EKG    5. Hypercholesteremia    6. Family history of cardiac arrest    7. Status post placement of implantable loop recorder       Cardiac History:   Echo 2-23-16=dynamic obstruction during Valslava in the  outflow tract with a peak velocity of 3.8 cm/s and a peak gradient of 60  mmHg. There was dynamic obstruction at rest in the outflow tract with a  peak velocity 3.3 m/s and a peak gradient of 45 mmHg. Systolic function  was normal. Ejection fraction  55 % to 65 %. Wall thickness was  mildly to moderately increased  Mitral valve:  mild regurgitation. Holter Monitor, November 2012: PVCs , short run of bigeminy but no VT.  ECHO 11/21/12 LVEF of 75 % severe asymmetric hypertrophy of the septum. dynamic obstruction during Valsalva in the outflow tract, peak velocity of 3.6 cm/s and a peak gradient of 50 mmHg. IVS= 17 mm Pw= 9 mm.   mild mitral valve regurgitation systolic anterior motion (JUNIE)of the chordal structures. JENNIFER g no evidence of ischemia with the patient walking 9 minutes on the David Protocol and achieving 94% of MPHR. MRI which was terminated early on 11/28/13 due to a claustrophobic reaction demonstrated an LVEF of 76% with mild septal hypertrophy. There was a very steep aorto-septal angle of 107 degrees due to the sigmoid septum and the steep angulation of the aortic root creating an unfavorable mitral valve apparatus apposition with the septum leading to significant JUNIE of the chordae and left ventricular outflow tract obstruction.     Social History: Nonsmoker. Two to three beers per day. Works as an , has five children. He is  and does a lot of activities with his children. He does some foreign travel though, mainly [place], once to [place]. He has had no illnesses there. maternal uncle with a history of CABG and MI who  at age 46 and a maternal grandfather with known coronary disease and \"heart attacks\" who  at age 50. Daughter had sudden collapse, cardiac arrest with exercising, seen at [facility]. ROS-except as noted above. . A complete cardiac and respiratory are reviewed and negative except as above ; Resp-denies wheezing  or productive cough,. Const- No unusual weight loss or fever; Neuro-no recent seizure or CVA ; GI- No BRBPR, abdom pain, bloating ; - no  hematuria   see supplement sheet, initialed and to be scanned by staff  Past Medical History:   Diagnosis Date    Asymmetric septal [facility]     MRI 2013 and echo    Family history of cardiac arrest     daugher on treadmill, followed by EP at [facility] Dr Erika Dowell Hypertrophic cardiomyopathy ([facility]     dynamic obstruction during Valsalva in the outflow tract, peak velocity of 3.6 cm/s and a peak gradient of 50 mmHg    Mitral regurgitation     Systolic anterior movement of mitral valve     JUNIE      Social Hx= reports that he has never smoked. He does not have any smokeless tobacco history on file. He reports that he drinks alcohol. Exam and Labs:    Visit Vitals    /80 (BP 1 Location: Left arm)    Pulse 72    Resp 18    Ht 6' 3\" (1.905 m)    Wt 191 lb (86.6 kg)    SpO2 99%    BMI 23.87 kg/m2      Constitutional:  NAD, comfortable  Head: NC,AT. Eyes: No scleral icterus. Neck:  Neck supple. No JVD present. Throat: moist mucous membranes. Chest: Effort normal & normal respiratory excursion . Neurological: alert, conversant and oriented . Skin: Skin is not cold. No obvious systemic rash noted. Not diaphoretic.  No erythema. Psychiatric:  Grossly normal mood and affect. Behavior appears normal. Extremities:  no clubbing or cyanosis. Abdomen: non distended    Lungs:breath sounds normal. No stridor. distress, wheezes or  Rales. Heart:    2/6 low pitched systolic murmur. Loudest at the RUSB and the apex. normal rate, regular rhythm, normal S1, S2, rubs, clicks or gallops , PMI non displaced. Edema: Edema is none. Lab Results   Component Value Date/Time    Sodium 140 03/17/2017 11:49 AM    Potassium 4.3 03/17/2017 11:49 AM    Chloride 97 03/17/2017 11:49 AM    CO2 24 03/17/2017 11:49 AM    Glucose 99 03/17/2017 11:49 AM    BUN 20 03/17/2017 11:49 AM    Creatinine 0.74 03/17/2017 11:49 AM    BUN/Creatinine ratio 27 03/17/2017 11:49 AM    GFR est  03/17/2017 11:49 AM    GFR est non- 03/17/2017 11:49 AM    Calcium 10.0 03/17/2017 11:49 AM      Wt Readings from Last 3 Encounters:   10/12/17 191 lb (86.6 kg)   06/13/17 198 lb 6.4 oz (90 kg)   05/24/17 185 lb (83.9 kg)      BP Readings from Last 3 Encounters:   10/12/17 132/80   06/13/17 118/70   05/24/17 123/56        Current Outpatient Prescriptions   Medication Sig    nebivolol (BYSTOLIC) 5 mg tablet Take 1 Tab by mouth daily.  fluticasone (FLONASE) 50 mcg/actuation nasal spray by Both Nostrils route daily.  Cetirizine (ZYRTEC) 10 mg cap Take  by mouth daily.  aspirin (ASPIRIN) 325 mg tablet Take 325 mg by mouth daily.  multivitamin (ONE A DAY) tablet Take 1 Tab by mouth daily. No current facility-administered medications for this visit. Impression see above.     Written by Faustino Escobar, as dictated by Foreign Flynn MD.

## 2017-10-12 NOTE — MR AVS SNAPSHOT
Visit Information Date & Time Provider Department Dept. Phone Encounter #  
 10/12/2017  9:00 AM Anahy Guzmán MD [facility] Kathi Salazar [phone] [id number] Upcoming Health Maintenance Date Due Hepatitis C Screening 1960 Pneumococcal 19-64 Medium Risk (1 of 1 - PPSV23) 12/16/1980 DTaP/Tdap/Td series (1 - Tdap) 12/16/1982 FOBT Q 1 YEAR AGE 50-75 12/16/2011 INFLUENZA AGE 9 TO ADULT 8/1/2017 Allergies as of 10/12/2017  Review Complete On: 10/12/2017 By: Roberto Garcia No Known Allergies Current Immunizations  Never Reviewed No immunizations on file. Not reviewed this visit You Were Diagnosed With   
  
 Codes Comments Symptomatic PVCs    -  Primary ICD-10-CM: I49.3 ICD-9-CM: 427.69 Abnormal EKG     ICD-10-CM: R94.31 
ICD-9-CM: 794.31 Vitals BP Pulse Resp Height(growth percentile) Weight(growth percentile) SpO2  
 132/80 (BP 1 Location: Left arm) 72 18 6' 3\" (1.905 m) 191 lb (86.6 kg) 99% BMI Smoking Status 23.87 kg/m2 Never Smoker Vitals History BMI and BSA Data Body Mass Index Body Surface Area  
 23.87 kg/m 2 2.14 m 2 Preferred Pharmacy Pharmacy Name Phone [facility], [address] [phone] Your Updated Medication List  
  
   
This list is accurate as of: 10/12/17  9:54 AM.  Always use your most recent med list.  
  
  
  
  
 aspirin 325 mg tablet Commonly known as:  ASPIRIN Take 325 mg by mouth daily. fluticasone 50 mcg/actuation nasal spray Commonly known as:  FLONASE  
by Both Nostrils route daily. multivitamin tablet Commonly known as:  ONE A DAY Take 1 Tab by mouth daily. nebivolol 5 mg tablet Commonly known as:  BYSTOLIC Take 1 Tab by mouth daily. ZyrTEC 10 mg Cap Generic drug:  Cetirizine Take  by mouth daily. We Performed the Following AMB POC EKG ROUTINE W/ 12 LEADS, INTER & REP [62714 CPT(R)] Patient Instructions Follow up with  in 6 months Introducing [facility]! Dear Elfreda Bamberger: 
Thank you for requesting a [employer] account. Our records indicate that you already have an active [employer] account. You can access your account anytime at [email or web address]. [email or web address] Did you know that you can access your hospital and ER discharge instructions at any time in [employer]? You can also review all of your test results from your hospital stay or ER visit. Additional Information If you have questions, please visit the Frequently Asked Questions section of the [employer] website at [email or web address] Remember, [employer] is NOT to be used for urgent needs. For medical emergencies, dial 911. Now available from your iPhone and Android! Please provide this summary of care documentation to your next provider. Your primary care clinician is listed as Rufina Hi. If you have any questions after today's visit, please call [phone].

## 2017-10-31 ENCOUNTER — OFFICE VISIT (OUTPATIENT)
Dept: CARDIOLOGY CLINIC | Age: 57
End: 2017-10-31

## 2017-10-31 DIAGNOSIS — Z95.818 STATUS POST PLACEMENT OF IMPLANTABLE LOOP RECORDER: ICD-10-CM

## 2017-10-31 DIAGNOSIS — I49.3 SYMPTOMATIC PVCS: Primary | ICD-10-CM

## 2017-11-05 PROBLEM — I48.0 PAF (PAROXYSMAL ATRIAL FIBRILLATION) (HCC): Status: ACTIVE | Noted: 2017-11-05

## 2017-11-10 DIAGNOSIS — I42.1 HYPERTROPHIC OBSTRUCTIVE CARDIOMYOPATHY (HOCM) (HCC): ICD-10-CM

## 2017-11-10 RX ORDER — NEBIVOLOL 5 MG/1
5 TABLET ORAL DAILY
Qty: 90 TAB | Refills: 1 | Status: SHIPPED | OUTPATIENT
Start: 2017-11-10 | End: 2018-04-17 | Stop reason: SDUPTHER

## 2017-11-10 RX ORDER — NEBIVOLOL HYDROCHLORIDE 5 MG/1
TABLET ORAL
Qty: 90 TAB | Refills: 1 | Status: SHIPPED | OUTPATIENT
Start: 2017-11-10 | End: 2018-04-16 | Stop reason: SDUPTHER

## 2017-11-10 NOTE — TELEPHONE ENCOUNTER
Request for bystolic 5mg every day. Last office visit 10/12/17, next office visit 4/17/18.  Refills per verbal order from Dr. Ryder Phan.

## 2017-11-10 NOTE — TELEPHONE ENCOUNTER
Request for bystolic 5mg every day. Last office visit 10/12/17, next office visit 4/17/18.  Refills per verbal order from Dr. Nelly Romo.

## 2017-11-10 NOTE — TELEPHONE ENCOUNTER
From: Hieu Dickerson  To: Raquel Pulliam MD  Sent: 11/10/2017 3:15 PM EST  Subject: Medication Renewal Request    Original authorizing provider: MD Hieu Ritter would like a refill of the following medications:  nebivolol (BYSTOLIC) 5 mg tablet Raquel Pulliam MD]    Preferred pharmacy: [place]    Comment:  Need to refill this prescription. I use [facility] mail order and they fill it 90 days at a time.  Thanks, Hieu Dickerson [phone]

## 2017-11-21 ENCOUNTER — TELEPHONE (OUTPATIENT)
Dept: CARDIOLOGY CLINIC | Age: 57
End: 2017-11-21

## 2017-11-21 NOTE — TELEPHONE ENCOUNTER
Voice mail message left for patient to return call. No PHI left on message. Called to see if patient is interested in changing from Bystolic medication to a covered alternative medication suggested by patient's insurance company. Awaiting call back.

## 2017-11-27 NOTE — TELEPHONE ENCOUNTER
Spoke with patient using two identifiers, name and . Patient notified of insurance company alternative meds to current [facility] med. Patient reports he will call insurance company to discuss cost difference and return call to office if decides to change.

## 2017-12-05 ENCOUNTER — OFFICE VISIT (OUTPATIENT)
Dept: CARDIOLOGY CLINIC | Age: 57
End: 2017-12-05

## 2017-12-05 DIAGNOSIS — I48.0 PAF (PAROXYSMAL ATRIAL FIBRILLATION) (HCC): Primary | ICD-10-CM

## 2017-12-05 DIAGNOSIS — Z95.818 STATUS POST PLACEMENT OF IMPLANTABLE LOOP RECORDER: ICD-10-CM

## 2017-12-05 DIAGNOSIS — I49.3 SYMPTOMATIC PVCS: ICD-10-CM

## 2017-12-08 ENCOUNTER — HOSPITAL ENCOUNTER (OUTPATIENT)
Dept: MRI IMAGING | Age: 57
Discharge: HOME | End: 2017-12-08
Attending: ORTHOPAEDIC SURGERY

## 2017-12-08 DIAGNOSIS — S46.011A STRAIN OF TENDON OF RIGHT ROTATOR CUFF: ICD-10-CM

## 2017-12-12 ENCOUNTER — DOCUMENTATION ONLY (OUTPATIENT)
Dept: CARDIOLOGY CLINIC | Age: 57
End: 2017-12-12

## 2017-12-12 ENCOUNTER — OFFICE VISIT (OUTPATIENT)
Dept: CARDIOLOGY CLINIC | Age: 57
End: 2017-12-12

## 2017-12-12 DIAGNOSIS — I48.0 PAF (PAROXYSMAL ATRIAL FIBRILLATION) (HCC): ICD-10-CM

## 2017-12-12 DIAGNOSIS — Z95.818 STATUS POST PLACEMENT OF IMPLANTABLE LOOP RECORDER: Primary | ICD-10-CM

## 2018-01-09 ENCOUNTER — OFFICE VISIT (OUTPATIENT)
Dept: CARDIOLOGY CLINIC | Age: 58
End: 2018-01-09

## 2018-01-09 DIAGNOSIS — I48.0 PAF (PAROXYSMAL ATRIAL FIBRILLATION) (HCC): Primary | ICD-10-CM

## 2018-01-09 DIAGNOSIS — Z95.818 STATUS POST PLACEMENT OF IMPLANTABLE LOOP RECORDER: ICD-10-CM

## 2018-01-15 ENCOUNTER — TELEPHONE (OUTPATIENT)
Dept: CARDIOLOGY CLINIC | Age: 58
End: 2018-01-15

## 2018-02-13 ENCOUNTER — OFFICE VISIT (OUTPATIENT)
Dept: CARDIOLOGY CLINIC | Age: 58
End: 2018-02-13

## 2018-02-13 DIAGNOSIS — Z95.818 STATUS POST PLACEMENT OF IMPLANTABLE LOOP RECORDER: ICD-10-CM

## 2018-02-13 DIAGNOSIS — I49.3 SYMPTOMATIC PVCS: Primary | ICD-10-CM

## 2018-02-13 DIAGNOSIS — I48.0 PAF (PAROXYSMAL ATRIAL FIBRILLATION) (HCC): ICD-10-CM

## 2018-03-11 ENCOUNTER — DOCUMENTATION ONLY (OUTPATIENT)
Dept: CARDIOLOGY CLINIC | Age: 58
End: 2018-03-11

## 2018-03-12 NOTE — PROGRESS NOTES
Implantable loop monitor showed 3 second sinus pause and 2 seconds of sinus pause at 9 AM    Future Appointments  Date Time Provider Johnathon Saxena   4/17/2018 8:20 AM Kelli Schuster  [address]

## 2018-03-12 NOTE — PROGRESS NOTES
This happened around 9 am  Do not know if he was napping or had any symptoms  He will have follow up soon with Dr Vanesa Hughes  In the past he had PVC and short PAF  So cannot increase more medication  Current Outpatient Prescriptions on File Prior to Visit   Medication Sig Dispense Refill    BYSTOLIC 5 mg tablet TAKE 1 TABLET DAILY 90 Tab 1    nebivolol (BYSTOLIC) 5 mg tablet Take 1 Tab by mouth daily. 90 Tab 1    fluticasone (FLONASE) 50 mcg/actuation nasal spray by Both Nostrils route daily.  Cetirizine (ZYRTEC) 10 mg cap Take  by mouth daily.  aspirin (ASPIRIN) 325 mg tablet Take 325 mg by mouth daily.  multivitamin (ONE A DAY) tablet Take 1 Tab by mouth daily. No current facility-administered medications on file prior to visit.

## 2018-03-20 ENCOUNTER — OFFICE VISIT (OUTPATIENT)
Dept: CARDIOLOGY CLINIC | Age: 58
End: 2018-03-20

## 2018-03-20 DIAGNOSIS — I48.0 PAF (PAROXYSMAL ATRIAL FIBRILLATION) (HCC): ICD-10-CM

## 2018-03-20 DIAGNOSIS — I49.3 SYMPTOMATIC PVCS: Primary | ICD-10-CM

## 2018-03-20 DIAGNOSIS — Z95.818 STATUS POST PLACEMENT OF IMPLANTABLE LOOP RECORDER: ICD-10-CM

## 2018-04-16 DIAGNOSIS — I42.1 HYPERTROPHIC OBSTRUCTIVE CARDIOMYOPATHY (HOCM) (HCC): ICD-10-CM

## 2018-04-17 ENCOUNTER — OFFICE VISIT (OUTPATIENT)
Dept: CARDIOLOGY CLINIC | Age: 58
End: 2018-04-17

## 2018-04-17 VITALS
OXYGEN SATURATION: 98 % | DIASTOLIC BLOOD PRESSURE: 68 MMHG | BODY MASS INDEX: 23.95 KG/M2 | RESPIRATION RATE: 16 BRPM | HEART RATE: 76 BPM | WEIGHT: 192.6 LBS | SYSTOLIC BLOOD PRESSURE: 102 MMHG | HEIGHT: 75 IN

## 2018-04-17 DIAGNOSIS — E78.00 HYPERCHOLESTEREMIA: ICD-10-CM

## 2018-04-17 DIAGNOSIS — Z82.49 FAMILY HISTORY OF CARDIAC ARREST: ICD-10-CM

## 2018-04-17 DIAGNOSIS — Z95.818 STATUS POST PLACEMENT OF IMPLANTABLE LOOP RECORDER: ICD-10-CM

## 2018-04-17 DIAGNOSIS — I34.0 NON-RHEUMATIC MITRAL REGURGITATION: ICD-10-CM

## 2018-04-17 DIAGNOSIS — I49.3 SYMPTOMATIC PVCS: ICD-10-CM

## 2018-04-17 DIAGNOSIS — I48.0 PAF (PAROXYSMAL ATRIAL FIBRILLATION) (HCC): ICD-10-CM

## 2018-04-17 DIAGNOSIS — I42.1 HYPERTROPHIC OBSTRUCTIVE CARDIOMYOPATHY (HOCM) (HCC): Primary | ICD-10-CM

## 2018-04-17 DIAGNOSIS — I34.89 SYSTOLIC ANTERIOR MOVEMENT OF MITRAL VALVE: ICD-10-CM

## 2018-04-17 RX ORDER — NEBIVOLOL HYDROCHLORIDE 5 MG/1
TABLET ORAL
Qty: 90 TAB | Refills: 1 | Status: SHIPPED | OUTPATIENT
Start: 2018-04-17 | End: 2018-10-14 | Stop reason: SDUPTHER

## 2018-04-17 NOTE — PROGRESS NOTES
Makenzie Marsh     1960       Grant Cain MD, [facility] Ferraris  Date of Visit-4/17/2018   PCP is Yemi Lopez MD   [address] [facility] [place]  [facility]  HPI:  Makenzie Marsh is a 65 y.o. male   Has a hx of IHSS with PVCs. His daughter has had sudden death with revival and may have ARVCD. His family has been tested and no other members of the family have [address] Has IRL with brief AF  Had few PVCs, they have resolved as he has limited salt and caffeine     The MRI confirmed IHSS with no scarring, wall thickness inferior 19 mm basal anterior septum 16 mm. There was an elongated MV leaflet causing mild JUNIE, based on this we recommended that he engage in non competitive athletics. Stress test 7/18/17 he walked for 11 minutes to a peak HR of 152 which is 92%. Overall the pt states they are doing well. He reports that he is exercising moderately everyday and is doing well with this. Denies chest pain, edema, syncope or shortness of breath at rest, has no tachycardia, palpitations or sense of arrhythmia. Assessment/Plan:     1. IHSS   ----confirmed by MRI with normal LV function and has JUNIE with lack of scar Dr. Gabriela Gonzalez and I feel no defibrillator is needed.  ==== Pt avoids competitive sports and IRL has shown no ventricular arrhythmia. ------Stress ECHO WNL. 2. PAF:   -----had a short run in July, none since then, stable. Continue IRL monitoring. No [facility] planned as low risk  3. PVCs   ----now asymptomatic.off salty foods which were trigger  4. Fam HX of cardiac arrest:  ---- daughter has gotten a sympathetectomy and doing well.  5. F/u in 6 months. Went over MRI again, en pics etc  Went over LVOT and JUNIE of MV  Future Appointments  Date Time Provider Johnathon Saxena   10/16/2018 8:20 AM MD SAMARA Urias SCHED       Impression:   1. Hypertrophic obstructive cardiomyopathy (HOCM) ([address]    2. Non-rheumatic mitral regurgitation    3.  Systolic anterior movement of mitral valve    4. PAF (paroxysmal atrial fibrillation) ([address]    5. Symptomatic PVCs    6. Hypercholesteremia    7. Family history of cardiac arrest    8. Status post placement of implantable loop recorder       Cardiac History:   Echo 16=dynamic obstruction during Valslava in the  outflow tract with a peak velocity of 3.8 cm/s and a peak gradient of 60  mmHg. There was dynamic obstruction at rest in the outflow tract with a  peak velocity 3.3 m/s and a peak gradient of 45 mmHg. Systolic function  was normal. Ejection fraction  55 % to 65 %. Wall thickness was  mildly to moderately increased  Mitral valve:  mild regurgitation. Holter Monitor, 2012: PVCs , short run of bigeminy but no VT.  ECHO 12 LVEF of 75 % severe asymmetric hypertrophy of the septum. dynamic obstruction during Valsalva in the outflow tract, peak velocity of 3.6 cm/s and a peak gradient of 50 mmHg. IVS= 17 mm Pw= 9 mm.   mild mitral valve regurgitation systolic anterior motion (JUNIE)of the chordal structures. JENNIFER g no evidence of ischemia with the patient walking 9 minutes on the David Protocol and achieving 94% of MPHR. MRI which was terminated early on 13 due to a claustrophobic reaction demonstrated an LVEF of 76% with mild septal hypertrophy. There was a very steep aorto-septal angle of 107 degrees due to the sigmoid septum and the steep angulation of the aortic root creating an unfavorable mitral valve apparatus apposition with the septum leading to significant JUNIE of the chordae and left ventricular outflow tract obstruction. Social History: Nonsmoker. Two to three beers per day. Works as an , has five children. He is  and does a lot of activities with his children. He does some foreign travel though, mainly [place], once to [place]. He has had no illnesses there.     maternal uncle with a history of CABG and MI who  at age 46 and a maternal grandfather with known coronary disease and \"heart attacks\" who  at age 50. Daughter had sudden collapse, cardiac arrest with exercising, [place] [facility]. ROS-except as noted above. . A complete cardiac and respiratory are reviewed and negative except as above ; Resp-denies wheezing  or productive cough,. Const- No unusual weight loss or fever; Neuro-no recent seizure or CVA ; GI- No BRBPR, abdom pain, bloating ; - no  hematuria   see supplement sheet, initialed and to be scanned by staff  Past Medical History:   Diagnosis Date    Asymmetric septal hypertrophy ([address]     MRI 2013 and echo    Family history of cardiac arrest     luiser on treadmill, followed by EP at [facility] Dr Tony Joseph Hypertrophic cardiomyopathy ([facility]     dynamic obstruction during Valsalva in the outflow tract, peak velocity of 3.6 cm/s and a peak gradient of 50 mmHg    Mitral regurgitation     PAF (paroxysmal atrial fibrillation) ([address]     Systolic anterior movement of mitral valve     JUNIE      Social Hx= reports that he has never smoked. He has never used smokeless tobacco. He reports that he drinks alcohol. Exam and Labs:    Visit Vitals    /68 (BP 1 Location: Left arm)    Pulse 76    Resp 16    Ht 6' 3\" (1.905 m)    Wt 192 lb 9.6 oz (87.4 kg)    SpO2 98%    BMI 24.07 kg/m2      Constitutional:  NAD, comfortable  Head: NC,AT. Eyes: No scleral icterus. Neck:  Neck supple. No JVD present. Throat: moist mucous membranes. Chest: Effort normal & normal respiratory excursion . Neurological: alert, conversant and oriented . Skin: Skin is not cold. No obvious systemic rash noted. Not diaphoretic. No erythema. Psychiatric:  Grossly normal mood and affect. Behavior appears normal. Extremities:  no clubbing or cyanosis. Abdomen: non distended    Lungs:breath sounds normal. No stridor. distress, wheezes or  Rales. Heart:    2/6 low pitched systolic murmur. Loudest at the RUSB and the apex.  normal rate, regular rhythm, normal S1, S2, rubs, clicks or gallops , PMI non displaced. Edema: Edema is none. Lab Results   Component Value Date/Time    Sodium 140 03/17/2017 11:49 AM    Potassium 4.3 03/17/2017 11:49 AM    Chloride 97 03/17/2017 11:49 AM    CO2 24 03/17/2017 11:49 AM    Glucose 99 03/17/2017 11:49 AM    BUN 20 03/17/2017 11:49 AM    Creatinine 0.74 (L) 03/17/2017 11:49 AM    BUN/Creatinine ratio 27 (H) 03/17/2017 11:49 AM    GFR est  03/17/2017 11:49 AM    GFR est non- 03/17/2017 11:49 AM    Calcium 10.0 03/17/2017 11:49 AM      Wt Readings from Last 3 Encounters:   04/17/18 192 lb 9.6 oz (87.4 kg)   10/12/17 191 lb (86.6 kg)   06/13/17 198 lb 6.4 oz (90 kg)      BP Readings from Last 3 Encounters:   04/17/18 102/68   10/12/17 132/80   06/13/17 118/70        Current Outpatient Prescriptions   Medication Sig    BYSTOLIC 5 mg tablet TAKE 1 TABLET DAILY    fluticasone (FLONASE) 50 mcg/actuation nasal spray by Both Nostrils route daily.  Cetirizine (ZYRTEC) 10 mg cap Take  by mouth daily.  aspirin (ASPIRIN) 325 mg tablet Take 325 mg by mouth daily.  multivitamin (ONE A DAY) tablet Take 1 Tab by mouth daily. No current facility-administered medications for this visit. Impression see above.

## 2018-04-17 NOTE — MR AVS SNAPSHOT
[address] [facility] [address] [facility] 57 
[phone] Patient: Sammi Castano MRN: [id number] :1960 Visit Information Date & Time Provider Department Dept. Phone Encounter #  
 2018  8:20 AM Margoth Robbins MD [facility] Alfonso Ramsay [phone] [id number] Upcoming Health Maintenance Date Due Hepatitis C Screening 1960 Pneumococcal 19-64 Medium Risk (1 of 1 - PPSV23) 1980 DTaP/Tdap/Td series (1 - Tdap) 1982 FOBT Q 1 YEAR AGE 50-75 2011 Influenza Age 5 to Adult 2017 Allergies as of 2018  Review Complete On: 2018 By: Rashad Whalen No Known Allergies Current Immunizations  Never Reviewed No immunizations on file. Not reviewed this visit Vitals BP Pulse Resp Height(growth percentile) Weight(growth percentile) SpO2  
 102/68 (BP 1 Location: Left arm) 76 16 6' 3\" (1.905 m) 192 lb 9.6 oz (87.4 kg) 98% BMI Smoking Status 24.07 kg/m2 Never Smoker Vitals History BMI and BSA Data Body Mass Index Body Surface Area 24.07 kg/m 2 2.15 m 2 Preferred Pharmacy Pharmacy Name Phone Conor Rapp, [facility] [phone] Your Updated Medication List  
  
   
This list is accurate as of 18  9:02 AM.  Always use your most recent med list.  
  
  
  
  
 aspirin 325 mg tablet Commonly known as:  ASPIRIN Take 325 mg by mouth daily. BYSTOLIC 5 mg tablet Generic drug:  nebivolol TAKE 1 TABLET DAILY  
  
 fluticasone 50 mcg/actuation nasal spray Commonly known as:  FLONASE  
by Both Nostrils route daily. multivitamin tablet Commonly known as:  ONE A DAY Take 1 Tab by mouth daily. ZyrTEC 10 mg Cap Generic drug:  Cetirizine Take  by mouth daily. Patient Instructions You will need to follow up in clinic with Dr. Vik Soni in 6 months. Introducing [facility]! Dear Cintia Whitaker: 
Thank you for requesting a StudioNow account. Our records indicate that you already have an active StudioNow account. You can access your account anytime at [email or web address] Did you know that you can access your hospital and ER discharge instructions at any time in StudioNow? You can also review all of your test results from your hospital stay or ER visit. Additional Information If you have questions, please visit the Frequently Asked Questions section of the StudioNow website at [email or web address] Remember, StudioNow is NOT to be used for urgent needs. For medical emergencies, dial 911. Now available from your iPhone and Android! Please provide this summary of care documentation to your next provider. Your primary care clinician is listed as Madina Hooker. If you have any questions after today's visit, please call [phone].

## 2018-04-17 NOTE — TELEPHONE ENCOUNTER
Request for bystolic 5 mg, daily. Last office visit 4/17/18,   Next office visit 10/16/18.      Refills per verbal order from Dr. Moe Shahid.

## 2018-04-23 ENCOUNTER — OFFICE VISIT (OUTPATIENT)
Dept: CARDIOLOGY CLINIC | Age: 58
End: 2018-04-23

## 2018-04-23 DIAGNOSIS — I49.3 SYMPTOMATIC PVCS: Primary | ICD-10-CM

## 2018-04-23 DIAGNOSIS — I48.0 PAF (PAROXYSMAL ATRIAL FIBRILLATION) (HCC): ICD-10-CM

## 2018-05-15 ENCOUNTER — DOCUMENTATION ONLY (OUTPATIENT)
Dept: CARDIOLOGY CLINIC | Age: 58
End: 2018-05-15

## 2018-05-15 ENCOUNTER — OFFICE VISIT (OUTPATIENT)
Dept: CARDIOLOGY CLINIC | Age: 58
End: 2018-05-15

## 2018-05-15 DIAGNOSIS — Z95.818 STATUS POST PLACEMENT OF IMPLANTABLE LOOP RECORDER: Primary | ICD-10-CM

## 2018-05-29 ENCOUNTER — OFFICE VISIT (OUTPATIENT)
Dept: CARDIOLOGY CLINIC | Age: 58
End: 2018-05-29

## 2018-05-29 DIAGNOSIS — Z95.818 STATUS POST PLACEMENT OF IMPLANTABLE LOOP RECORDER: Primary | ICD-10-CM

## 2018-06-25 ENCOUNTER — OFFICE VISIT (OUTPATIENT)
Dept: CARDIOLOGY CLINIC | Age: 58
End: 2018-06-25

## 2018-06-25 DIAGNOSIS — Z95.818 STATUS POST PLACEMENT OF IMPLANTABLE LOOP RECORDER: Primary | ICD-10-CM

## 2018-07-09 ENCOUNTER — OFFICE VISIT (OUTPATIENT)
Dept: CARDIOLOGY CLINIC | Age: 58
End: 2018-07-09

## 2018-07-09 DIAGNOSIS — Z95.818 STATUS POST PLACEMENT OF IMPLANTABLE LOOP RECORDER: Primary | ICD-10-CM

## 2018-08-29 ENCOUNTER — OFFICE VISIT (OUTPATIENT)
Dept: CARDIOLOGY CLINIC | Age: 58
End: 2018-08-29

## 2018-08-29 DIAGNOSIS — Z95.818 STATUS POST PLACEMENT OF IMPLANTABLE LOOP RECORDER: Primary | ICD-10-CM

## 2018-10-16 ENCOUNTER — OFFICE VISIT (OUTPATIENT)
Dept: CARDIOLOGY CLINIC | Age: 58
End: 2018-10-16

## 2018-10-16 VITALS
BODY MASS INDEX: 24.29 KG/M2 | HEART RATE: 67 BPM | DIASTOLIC BLOOD PRESSURE: 70 MMHG | WEIGHT: 195.4 LBS | OXYGEN SATURATION: 98 % | RESPIRATION RATE: 16 BRPM | SYSTOLIC BLOOD PRESSURE: 130 MMHG | HEIGHT: 75 IN

## 2018-10-16 DIAGNOSIS — I34.0 NON-RHEUMATIC MITRAL REGURGITATION: ICD-10-CM

## 2018-10-16 DIAGNOSIS — I34.89 SYSTOLIC ANTERIOR MOVEMENT OF MITRAL VALVE: ICD-10-CM

## 2018-10-16 DIAGNOSIS — I48.0 PAF (PAROXYSMAL ATRIAL FIBRILLATION) (HCC): ICD-10-CM

## 2018-10-16 DIAGNOSIS — I49.3 SYMPTOMATIC PVCS: ICD-10-CM

## 2018-10-16 DIAGNOSIS — E78.00 HYPERCHOLESTEREMIA: ICD-10-CM

## 2018-10-16 DIAGNOSIS — I42.1 HYPERTROPHIC OBSTRUCTIVE CARDIOMYOPATHY (HOCM) (HCC): Primary | ICD-10-CM

## 2018-10-16 DIAGNOSIS — Z82.49 FAMILY HISTORY OF CARDIAC ARREST: ICD-10-CM

## 2018-10-16 DIAGNOSIS — Z95.818 STATUS POST PLACEMENT OF IMPLANTABLE LOOP RECORDER: ICD-10-CM

## 2018-10-16 NOTE — PROGRESS NOTES
Mery Schuster     1960       Grant La MD, [facility] [place] 
  
PCP is Hollie Argueta MD  
[facility] [place] [facility] Date of Visit-10/16/2018 HPI:  Mery Schuster is a 65 y.o. male · Has a hx of IHSS with PVCs. His daughter has had sudden death with revival and may have ARVCD. His family has been tested and no other members of the family have [address] or [address] · Has IRL with brief AF 
· Had few PVCs, they have resolved as he has limited salt and caffeine ·  The MRI confirmed IHSS with no scarring, wall thickness inferior 19 mm basal anterior septum 16 mm. There was an elongated MV leaflet causing mild JUNIE, based on this we recommended that he engage in non competitive athletics. · Stress test 7/18/17 he walked for 11 minutes to a peak HR of 152 which is 92%. Feeling well, exercising Had lots of URI, cold issues last spring, resolved Goes to gym every morning, family doing well Denies chest pain, edema, syncope or shortness of breath at rest  
Has no tachycardia , palpitations or sense of arrythmia EKG: NSR large T waves anterior leads normal axis and intervals Assessment/Plan:    
1. IHSS  
----confirmed by MRI with normal LV function and has JUNIE with lack of scar Dr. Sonia Reese and I feel no defibrillator is needed. 
==== Pt avoids competitive sports and IRL has shown no ventricular arrhythmia. ------Stress ECHO WNL. 2. PAF:  
-----short runs stable. No OAC planned as low risk 3. PVCs  
----now asymptomatic. 4. Fam HX of cardiac arrest: 
---- daughter has gotten a sympathetectomy and doing well. Dictation on: 11/04/2018  6:32 PM by: Kristel Ohara [[id number]] has questions about removal and whether will be covered 1. Hypertrophic obstructive cardiomyopathy (HOCM) ([address] 2. PAF (paroxysmal atrial fibrillation) ([address] 3. Non-rheumatic mitral regurgitation 4. Systolic anterior movement of mitral valve 5. Hypercholesteremia 6. Symptomatic PVCs 7. Family history of cardiac arrest   
8. Status post placement of implantable loop recorder Future Appointments Date Time Provider Johnathon Saxena 4/16/2019 8:20 AM Chesley Osler,  [address] Patient Instructions Please schedule follow up appointment with Dr. Bhanu Quinones in 6 months. Key CAD CHF Meds BYSTOLIC 5 mg tablet  (Taking) TAKE 1 TABLET DAILY  
 aspirin (ASPIRIN) 325 mg tablet  (Taking) Take 325 mg by mouth daily. BYSTOLIC 5 mg tablet (Discontinued) TAKE 1 TABLET DAILY Cardiac History:  
His is typical expression of HCM with two discrete areas of hypertrophy along with resting LVOT obstruction. Echo 2-23-16=dynamic obstruction during Valslava in the 
outflow tract with a peak velocity of 3.8 cm/s and a peak gradient of 60 
mmHg. There was dynamic obstruction at rest in the outflow tract with a 
peak velocity 3.3 m/s and a peak gradient of 45 mmHg. Systolic function 
was normal. Ejection fraction 55 % to 65 %. Wall thickness was 
mildly to moderately increased Mitral valve: mild regurgitation. Holter Monitor, November 2012: PVCs , short run of bigeminy but no VT. ECHO 11/21/12 LVEF of 75 % severe asymmetric hypertrophy of the septum. dynamic obstruction during Valsalva in the outflow tract, peak velocity of 3.6 cm/s and a peak gradient of 50 mmHg. IVS= 17 mm Pw= 9 mm.  
mild mitral valve regurgitation systolic anterior motion (JUNIE)of the chordal structures. JENNIFER g no evidence of ischemia with the patient walking 9 minutes on the David Protocol and achieving 94% of MPHR. MRI which was terminated early on 11/28/13 due to a claustrophobic reaction demonstrated an LVEF of 76% with mild septal hypertrophy.  There was a very steep aorto-septal angle of 107 degrees due to the sigmoid septum and the steep angulation of the aortic root creating an unfavorable mitral valve apparatus apposition with the septum leading to significant JUNIE of the chordae and left ventricular outflow tract obstruction. Social History: Nonsmoker. Two to three beers per day. Works as an , has five children. He is  and does a lot of activities with his children. He does some foreign travel though, mainly [place], once to [place]. He has had no illnesses there. maternal uncle with a history of CABG and MI who  at age 46 and a maternal grandfather with known coronary disease and \"heart attacks\" who  at age 50. Daughter had sudden collapse, cardiac arrest with exercising, seen at [facility]. ROS-except as noted above. . A complete cardiac and respiratory are reviewed and negative except as above ; Resp-denies wheezing  or productive cough,. Const- No unusual weight loss or fever; Neuro-no recent seizure or CVA ; GI- No BRBPR, abdom pain, bloating ; - no  hematuria  
see supplement sheet, initialed and to be scanned by staff Past Medical History:  
Diagnosis Date  Asymmetric septal hypertrophy ([facility] MRI 2013 and echo  Family history of cardiac arrest   
 daugher on treadmill, followed by EP at [facility] [address]  Hypercholesteremia  Hypertrophic cardiomyopathy ([facility] dynamic obstruction during Valsalva in the outflow tract, peak velocity of 3.6 cm/s and a peak gradient of 50 mmHg  Mitral regurgitation  PAF (paroxysmal atrial fibrillation) ([address] 2017  Systolic anterior movement of mitral valve JUNIE  
  
Social Hx= reports that he has never smoked. He has never used smokeless tobacco. He reports that he drinks alcohol. Exam and Labs:   
Visit Vitals  /70 (BP 1 Location: Left arm, BP Patient Position: Sitting)  Pulse 67  Resp 16  
 Ht 6' 3\" (1.905 m)  Wt 195 lb 6.4 oz (88.6 kg)  SpO2 98%  BMI 24.42 kg/m2 @Constitutional:  NAD, comfortable Head: NC,AT. Eyes: No scleral icterus. Neck:  Neck supple. No JVD present. Throat: moist mucous membranes. Chest: Effort normal & normal respiratory excursion . Neurological: alert, conversant and oriented . Skin: Skin is not cold. No obvious systemic rash noted. Not diaphoretic. No erythema. Psychiatric:  Grossly normal mood and affect. Behavior appears normal. Extremities:  no clubbing or cyanosis. Abdomen: non distended Lungs:breath sounds normal. No stridor. distress, wheezes or  Rales. Heart:2/6 low pitched systolic murmur. Loudest at the RUSB and the apex. normal rate, regular rhythm, normal S1, S2, no  
 rubs, clicks or gallops , PMI non displaced. Edema: Edema is none. No results found for: CHOL, CHOLX, CHLST, CHOLV, HDL, LDL, LDLC, DLDLP, TGLX, TRIGL, TRIGP, CHHD, CHHDX Lab Results Component Value Date/Time Sodium 140 03/17/2017 11:49 AM  
 Potassium 4.3 03/17/2017 11:49 AM  
 Chloride 97 03/17/2017 11:49 AM  
 CO2 24 03/17/2017 11:49 AM  
 Glucose 99 03/17/2017 11:49 AM  
 BUN 20 03/17/2017 11:49 AM  
 Creatinine 0.74 (L) 03/17/2017 11:49 AM  
 BUN/Creatinine ratio 27 (H) 03/17/2017 11:49 AM  
 GFR est  03/17/2017 11:49 AM  
 GFR est non- 03/17/2017 11:49 AM  
 Calcium 10.0 03/17/2017 11:49 AM  
  
Wt Readings from Last 3 Encounters:  
10/16/18 195 lb 6.4 oz (88.6 kg) 04/17/18 192 lb 9.6 oz (87.4 kg) 10/12/17 191 lb (86.6 kg) BP Readings from Last 3 Encounters:  
10/16/18 130/70  
04/17/18 102/68  
10/12/17 132/80 Current Outpatient Prescriptions Medication Sig  
 BYSTOLIC 5 mg tablet TAKE 1 TABLET DAILY  fluticasone (FLONASE) 50 mcg/actuation nasal spray by Both Nostrils route daily.  Cetirizine (ZYRTEC) 10 mg cap Take  by mouth daily.  aspirin (ASPIRIN) 325 mg tablet Take 325 mg by mouth daily.  multivitamin (ONE A DAY) tablet Take 1 Tab by mouth daily. No current facility-administered medications for this visit. Impression see above.

## 2018-10-16 NOTE — Clinical Note
Kvng Fuentes     1960       Grant Miller MD, [facility] 
  
PCP is Jose Reynolds MD  
[facility] [place] [facility] Date of Visit-10/16/2018 HPI:  Kvng Fuentes is a 65 y.o. male · Has a hx of IHSS with PVCs. His daughter has had sudden death with revival and may have ARVCD. His family has been tested and no other members of the family have [address] or [address] · Has IRL with brief AF 
· Had few PVCs, they have resolved as he has limited salt and caffeine ·  The MRI confirmed IHSS with no scarring, wall thickness inferior 19 mm basal anterior septum 16 mm. There was an elongated MV leaflet causing mild JUNIE, based on this we recommended that he engage in non competitive athletics. · Stress test 7/18/17 he walked for 11 minutes to a peak HR of 152 which is 92%. Feeling well, exercising Had lots of URI, cold issues last spring, resolved Goes to gym every morning, family doing well Denies chest pain, edema, syncope or shortness of breath at rest  
Has no tachycardia , palpitations or sense of arrythmia EKG: NSR large T waves anterior leads normal axis and intervals Assessment/Plan:    
1. IHSS  
----confirmed by MRI with normal LV function and has JUNIE with lack of scar Dr. Devan Valera and I feel no defibrillator is needed. 
==== Pt avoids competitive sports and IRL has shown no ventricular arrhythmia. ------Stress ECHO WNL. 2. PAF:  
-----short runs stable. No OAC planned as low risk 3. PVCs  
----now asymptomatic. 4. Fam HX of cardiac arrest: 
---- daughter has gotten a sympathetectomy and doing well. 5.  Non rheumatic mitral regurgitation with systolic anterior motion of mitral valve leaflet. 6.  Dyslipidemia. has questions about removal and whether will be covered 1. Hypertrophic obstructive cardiomyopathy (HOCM) ([address] 2. PAF (paroxysmal atrial fibrillation) ([address] 3. Non-rheumatic mitral regurgitation 4. Systolic anterior movement of mitral valve 5. Hypercholesteremia 6. Symptomatic PVCs 7. Family history of cardiac arrest   
8. Status post placement of implantable loop recorder Future Appointments Date Time Provider Johnathon Saxena 4/16/2019 8:20 AM Krystal Ospina  [address] Patient Instructions Please schedule follow up appointment with Dr. Erwin Miller in 6 months. Key CAD CHF Meds BYSTOLIC 5 mg tablet  (Taking) TAKE 1 TABLET DAILY  
 aspirin (ASPIRIN) 325 mg tablet  (Taking) Take 325 mg by mouth daily. BYSTOLIC 5 mg tablet (Discontinued) TAKE 1 TABLET DAILY Cardiac History:  
His is typical expression of HCM with two discrete areas of hypertrophy along with resting LVOT obstruction. Echo 2-23-16=dynamic obstruction during Valslava in the 
outflow tract with a peak velocity of 3.8 cm/s and a peak gradient of 60 
mmHg. There was dynamic obstruction at rest in the outflow tract with a 
peak velocity 3.3 m/s and a peak gradient of 45 mmHg. Systolic function 
was normal. Ejection fraction 55 % to 65 %. Wall thickness was 
mildly to moderately increased Mitral valve: mild regurgitation. Holter Monitor, November 2012: PVCs , short run of bigeminy but no VT. ECHO 11/21/12 LVEF of 75 % severe asymmetric hypertrophy of the septum. dynamic obstruction during Valsalva in the outflow tract, peak velocity of 3.6 cm/s and a peak gradient of 50 mmHg. IVS= 17 mm Pw= 9 mm.  
mild mitral valve regurgitation systolic anterior motion (JUNIE)of the chordal structures. JENNIFER g no evidence of ischemia with the patient walking 9 minutes on the David Protocol and achieving 94% of MPHR. MRI which was terminated early on 11/28/13 due to a claustrophobic reaction demonstrated an LVEF of 76% with mild septal hypertrophy.  There was a very steep aorto-septal angle of 107 degrees due to the sigmoid septum and the steep angulation of the aortic root creating an unfavorable mitral valve apparatus apposition with the septum leading to significant JUNIE of the chordae and left ventricular outflow tract obstruction. Social History: Nonsmoker. Two to three beers per day. Works as an , has five children. He is  and does a lot of activities with his children. He does some foreign travel though, mainly [place], once to [place]. He has had no illnesses there. maternal uncle with a history of CABG and MI who  at age 46 and a maternal grandfather with known coronary disease and \"heart attacks\" who  at age 50. Daughter had sudden collapse, cardiac arrest with exercising, [facility]. ROS-except as noted above. . A complete cardiac and respiratory are reviewed and negative except as above ; Resp-denies wheezing  or productive cough,. Const- No unusual weight loss or fever; Neuro-no recent seizure or CVA ; GI- No BRBPR, abdom pain, bloating ; - no  hematuria  
see supplement sheet, initialed and to be scanned by staff Past Medical History:  
Diagnosis Date  Asymmetric septal hypertrophy ([address] MRI 2013 and echo  Family history of cardiac arrest   
 luiser on treadmill, followed by EP at [facility] Dr Glory Sykes  Hypercholesteremia  Hypertrophic cardiomyopathy ([address] dynamic obstruction during Valsalva in the outflow tract, peak velocity of 3.6 cm/s and a peak gradient of 50 mmHg  Mitral regurgitation  PAF (paroxysmal atrial fibrillation) ([address] 2017  Systolic anterior movement of mitral valve JUNIE  
  
Social Hx= reports that he has never smoked. He has never used smokeless tobacco. He reports that he drinks alcohol. Exam and Labs:   
Visit Vitals  /70 (BP 1 Location: Left arm, BP Patient Position: Sitting)  Pulse 67  Resp 16  
 Ht 6' 3\" (1.905 m)  Wt 195 lb 6.4 oz (88.6 kg)  SpO2 98%  BMI 24.42 kg/m2 @Constitutional:  NAD, comfortable Head: NC,AT. Eyes: No scleral icterus. Neck:  Neck supple. No JVD present. Throat: moist mucous membranes. Chest: Effort normal & normal respiratory excursion . Neurological: alert, conversant and oriented . Skin: Skin is not cold. No obvious systemic rash noted. Not diaphoretic. No erythema. Psychiatric:  Grossly normal mood and affect. Behavior appears normal. Extremities:  no clubbing or cyanosis. Abdomen: non distended Lungs:breath sounds normal. No stridor. distress, wheezes or  Rales. Heart:2/6 low pitched systolic murmur. Loudest at the RUSB and the apex. normal rate, regular rhythm, normal S1, S2, no  
 rubs, clicks or gallops , PMI non displaced. Edema: Edema is none. No results found for: CHOL, CHOLX, CHLST, CHOLV, HDL, LDL, LDLC, DLDLP, TGLX, TRIGL, TRIGP, CHHD, CHHDX Lab Results Component Value Date/Time Sodium 140 03/17/2017 11:49 AM  
 Potassium 4.3 03/17/2017 11:49 AM  
 Chloride 97 03/17/2017 11:49 AM  
 CO2 24 03/17/2017 11:49 AM  
 Glucose 99 03/17/2017 11:49 AM  
 BUN 20 03/17/2017 11:49 AM  
 Creatinine 0.74 (L) 03/17/2017 11:49 AM  
 BUN/Creatinine ratio 27 (H) 03/17/2017 11:49 AM  
 GFR est  03/17/2017 11:49 AM  
 GFR est non- 03/17/2017 11:49 AM  
 Calcium 10.0 03/17/2017 11:49 AM  
  
Wt Readings from Last 3 Encounters:  
10/16/18 195 lb 6.4 oz (88.6 kg) 04/17/18 192 lb 9.6 oz (87.4 kg) 10/12/17 191 lb (86.6 kg) BP Readings from Last 3 Encounters:  
10/16/18 130/70  
04/17/18 102/68  
10/12/17 132/80 Current Outpatient Prescriptions Medication Sig  
 BYSTOLIC 5 mg tablet TAKE 1 TABLET DAILY  fluticasone (FLONASE) 50 mcg/actuation nasal spray by Both Nostrils route daily.  Cetirizine (ZYRTEC) 10 mg cap Take  by mouth daily.  aspirin (ASPIRIN) 325 mg tablet Take 325 mg by mouth daily.  multivitamin (ONE A DAY) tablet Take 1 Tab by mouth daily. No current facility-administered medications for this visit. Impression see above.

## 2018-10-16 NOTE — MR AVS SNAPSHOT
[address] [facility] [address] [place] [address] 
[phone] Patient: Mery Schuster MRN: [id number] :1960 Visit Information Date & Time Provider Department Dept. Phone Encounter #  
 10/16/2018  8:40 AM Christiano Sanches MD [facility] Bobo Rizo [phone] [id number] Upcoming Health Maintenance Date Due Hepatitis C Screening 1960 Pneumococcal 19-64 Medium Risk (1 of 1 - PPSV23) 1980 DTaP/Tdap/Td series (1 - Tdap) 1982 Shingrix Vaccine Age 50> (1 of 2) 2011 FOBT Q 1 YEAR AGE 50-75 2011 Influenza Age 5 to Adult 2018 Allergies as of 10/16/2018  Review Complete On: 10/16/2018 By: Morgan Jernigan No Known Allergies Current Immunizations  Never Reviewed No immunizations on file. Not reviewed this visit You Were Diagnosed With   
  
 Codes Comments PAF (paroxysmal atrial fibrillation) ([facility]    -  Primary ICD-10-CM: I48.0 ICD-9-CM: 427.31 Vitals BP Pulse Resp Height(growth percentile) Weight(growth percentile) SpO2  
 130/70 (BP 1 Location: Left arm, BP Patient Position: Sitting) 67 16 6' 3\" (1.905 m) 195 lb 6.4 oz (88.6 kg) 98% BMI Smoking Status 24.42 kg/m2 Never Smoker Vitals History BMI and BSA Data Body Mass Index Body Surface Area  
 24.42 kg/m 2 2.17 m 2 Preferred Pharmacy Pharmacy Name Phone Conor Rapp, [facility] [phone] Your Updated Medication List  
  
   
This list is accurate as of 10/16/18  9:17 AM.  Always use your most recent med list.  
  
  
  
  
 aspirin 325 mg tablet Commonly known as:  ASPIRIN Take 325 mg by mouth daily. BYSTOLIC 5 mg tablet Generic drug:  nebivolol TAKE 1 TABLET DAILY  
  
 fluticasone 50 mcg/actuation nasal spray Commonly known as:  FLONASE  
by Both Nostrils route daily. multivitamin tablet Commonly known as:  ONE A DAY Take 1 Tab by mouth daily. ZyrTEC 10 mg Cap Generic drug:  Cetirizine Take  by mouth daily. We Performed the Following AMB POC EKG ROUTINE W/ 12 LEADS, INTER & REP [34848 CPT(R)] Patient Instructions Please schedule follow up appointment with Dr. Raymond Mares in 6 months. Introducing [facility]! Dear Joseph Camacho: 
Thank you for requesting a INgrooves account. Our records indicate that you already have an active INgrooves account. You can access your account anytime at [email or web address] Did you know that you can access your hospital and ER discharge instructions at any time in INgrooves? You can also review all of your test results from your hospital stay or ER visit. Additional Information If you have questions, please visit the Frequently Asked Questions section of the INgrooves website at [email or web address] Remember, INgrooves is NOT to be used for urgent needs. For medical emergencies, dial 911. Now available from your iPhone and Android! Please provide this summary of care documentation to your next provider. Your primary care clinician is listed as Paris Ching. If you have any questions after today's visit, please call [phone].

## 2018-11-04 NOTE — PROGRESS NOTES
5.  Non rheumatic mitral regurgitation with systolic anterior motion of mitral valve leaflet. 6.  Dyslipidemia.

## 2018-11-05 ENCOUNTER — OFFICE VISIT (OUTPATIENT)
Dept: CARDIOLOGY CLINIC | Age: 58
End: 2018-11-05

## 2018-11-05 DIAGNOSIS — Z95.818 STATUS POST PLACEMENT OF IMPLANTABLE LOOP RECORDER: Primary | ICD-10-CM

## 2019-01-20 ENCOUNTER — DOCUMENTATION ONLY (OUTPATIENT)
Dept: CARDIOLOGY CLINIC | Age: 59
End: 2019-01-20

## 2019-04-02 ENCOUNTER — OFFICE VISIT (OUTPATIENT)
Dept: CARDIOLOGY CLINIC | Age: 59
End: 2019-04-02

## 2019-04-02 DIAGNOSIS — Z95.818 STATUS POST PLACEMENT OF IMPLANTABLE LOOP RECORDER: Primary | ICD-10-CM

## 2019-04-16 ENCOUNTER — OFFICE VISIT (OUTPATIENT)
Dept: CARDIOLOGY CLINIC | Age: 59
End: 2019-04-16

## 2019-04-16 VITALS
HEIGHT: 75 IN | DIASTOLIC BLOOD PRESSURE: 60 MMHG | WEIGHT: 195 LBS | HEART RATE: 91 BPM | OXYGEN SATURATION: 99 % | RESPIRATION RATE: 16 BRPM | SYSTOLIC BLOOD PRESSURE: 100 MMHG | BODY MASS INDEX: 24.25 KG/M2

## 2019-04-16 DIAGNOSIS — Z95.818 STATUS POST PLACEMENT OF IMPLANTABLE LOOP RECORDER: ICD-10-CM

## 2019-04-16 DIAGNOSIS — I42.1 HYPERTROPHIC OBSTRUCTIVE CARDIOMYOPATHY (HOCM) (HCC): Primary | ICD-10-CM

## 2019-04-16 DIAGNOSIS — E78.00 HYPERCHOLESTEREMIA: ICD-10-CM

## 2019-04-16 DIAGNOSIS — Z82.49 FAMILY HISTORY OF CARDIAC ARREST: ICD-10-CM

## 2019-04-16 DIAGNOSIS — I34.89 SYSTOLIC ANTERIOR MOVEMENT OF MITRAL VALVE: ICD-10-CM

## 2019-04-16 DIAGNOSIS — I49.3 SYMPTOMATIC PVCS: ICD-10-CM

## 2019-04-16 DIAGNOSIS — I34.0 NON-RHEUMATIC MITRAL REGURGITATION: ICD-10-CM

## 2019-04-16 DIAGNOSIS — I48.0 PAF (PAROXYSMAL ATRIAL FIBRILLATION) (HCC): ICD-10-CM

## 2019-04-16 NOTE — PROGRESS NOTES
Rhianna Fritz     1960       Grant Rouse MD, [facility]  Date of Visit-4/16/2019   PCP is Scooter Eagle MD   [facility] [place]  [facility]  HPI:  Rhianna Fritz is a 63 y.o. male   · Has a hx of IHSS with PVCs. His daughter has had sudden death with revival and may have ARVCD. His family has been tested and no other members of the family have [address] or ARVCD.   · Has IRL with brief AF  · Had few PVCs, they have resolved as he has limited salt and caffeine  ·  The MRI confirmed IHSS with no scarring, wall thickness inferior 19 mm basal anterior septum 16 mm. There was an elongated MV leaflet causing mild JUNIE, based on this we recommended that he engage in non competitive athletics. · Stress test 7/18/17 he walked for 11 minutes to a peak HR of 152 which is 92%. Today Mr. Carol Ann Gómez reports that he is feeling well. He has had normal breathing, and has stayed active. Pt works out every day and focuses on aerobic exercises, specifically swimming. For the last two weeks he has not taken any Bystolic. Went to his mother's for a week and forgot to bring it with him. He denies any issue with the medication, but just forgot. Pt believes he has about a half of a bottle left of 5 mg Bystolic, but is interested in reducing the prescription to 2.5 mg daily instead. Denies feeling any \"skipped beats\" recently. He has cut back on salt, caffeine, and alcohol to try to reduce PVCs. Denies chest pain, edema, syncope or shortness of breath at rest, has no tachycardia, palpitations or sense of arrhythmia. Assessment/Plan:     1. IHSS   ----confirmed by MRI with normal LV function and has JUNIE with lack of scar Dr. Suha Rm feels no defibrillator is needed.  ==== Pt avoids competitive sports and IRL has shown no ventricular arrhythmia. ------Stress ECHO WNL. Loud murmur  He asked about a lower dose of Bystolic, but I think we should stay on the current dose.     2. PAF: -----short runs stable. No OAC planned as low risk  3. PVCs   ----now asymptomatic. 4. Fam HX of cardiac arrest:  ---- daughter has gotten a sympathetectomy and doing well. 5.  Non rheumatic mitral regurgitation with systolic anterior motion of mitral valve leaflet. 6.  Dyslipidemia. F/U in 6 months       Impression:   1. Hypertrophic obstructive cardiomyopathy (HOCM) ([address]    2. PAF (paroxysmal atrial fibrillation) ([address]    3. Non-rheumatic mitral regurgitation    4. Systolic anterior movement of mitral valve    5. Hypercholesteremia    6. Symptomatic PVCs    7. Family history of cardiac arrest    8. Status post placement of implantable loop recorder       Cardiac History:   His is typical expression of HCM with two discrete areas of hypertrophy along with resting LVOT obstruction. Echo 2-23-16=dynamic obstruction during [place] in the  outflow tract with a peak velocity of 3.8 cm/s and a peak gradient of 60  mmHg. There was dynamic obstruction at rest in the outflow tract with a  peak velocity 3.3 m/s and a peak gradient of 45 mmHg. Systolic function  was normal. Ejection fraction 55 % to 65 %. Wall thickness was  mildly to moderately increased  Mitral valve: mild regurgitation. Holter Monitor, November 2012: PVCs , short run of bigeminy but no VT.  ECHO 11/21/12 LVEF of 75 % severe asymmetric hypertrophy of the septum. dynamic obstruction during Valsalva in the outflow tract, peak velocity of 3.6 cm/s and a peak gradient of 50 mmHg. IVS= 17 mm Pw= 9 mm.   mild mitral valve regurgitation systolic anterior motion (JUNIE)of the chordal structures. JENNIFER g no evidence of ischemia with the patient walking 9 minutes on the David Protocol and achieving 94% of MPHR. MRI which was terminated early on 11/28/13 due to a claustrophobic reaction demonstrated an LVEF of 76% with mild septal hypertrophy.  There was a very steep aorto-septal angle of 107 degrees due to the sigmoid septum and the steep angulation of the aortic root creating an unfavorable mitral valve apparatus apposition with the septum leading to significant JUNIE of the chordae and left ventricular outflow tract obstruction. Social History: Nonsmoker. Two to three beers per day. Works as an , has five children. He is  and does a lot of activities with his children. He does some foreign travel though, mainly [place], once to [place]. He has had no illnesses there. maternal uncle with a history of CABG and MI who  at age 46 and a maternal grandfather with known coronary disease and \"heart attacks\" who  at age 50. Daughter had sudden collapse, cardiac arrest with exercising, seen at [facility]. ROS-except as noted above. . A complete cardiac and respiratory are reviewed and negative except as above ; Resp-denies wheezing  or productive cough,. Const- No unusual weight loss or fever; Neuro-no recent seizure or CVA ; GI- No BRBPR, abdom pain, bloating ; - no  hematuria   see supplement sheet, initialed and to be scanned by staff  Past Medical History:   Diagnosis Date    Asymmetric septal hypertrophy ([address]     MRI 2013 and echo    Family history of cardiac arrest     daugher on treadmill, followed by EP at [facility] Dr Marta Johnson Hypertrophic cardiomyopathy ([facility]     dynamic obstruction during Valsalva in the outflow tract, peak velocity of 3.6 cm/s and a peak gradient of 50 mmHg    Mitral regurgitation     PAF (paroxysmal atrial fibrillation) ([address] 1571    Systolic anterior movement of mitral valve     JUNIE      Social Hx= reports that he has never smoked. He has never used smokeless tobacco. He reports that he drinks alcohol. Exam and Labs:  /60 (BP 1 Location: Left arm, BP Patient Position: Sitting)   Pulse 91   Resp 16   Ht 6' 3\" (1.905 m)   Wt 195 lb (88.5 kg)   SpO2 99%   BMI 24.37 kg/m² Constitutional:  NAD, comfortable  Head: NC,AT. Eyes: No scleral icterus. Neck:  Neck supple. No JVD present. Throat: moist mucous membranes. Chest: Effort normal & normal respiratory excursion . Neurological: alert, conversant and oriented . Skin: Skin is not cold. No obvious systemic rash noted. Not diaphoretic. No erythema. Psychiatric:  Grossly normal mood and affect. Behavior appears normal. Extremities:  no clubbing or cyanosis. Abdomen: non distended    Lungs:breath sounds normal. No stridor. distress, wheezes or  Rales. Heart: normal rate, regular rhythm with a low pitched systolic murmur 8-2/8 at the right upper sternal border loudest at LUSB with respiratory variation and also peaking at the apex, no rubs, clicks or gallops , PMI non displaced. Edema: Edema is none. Lab Results   Component Value Date/Time    Sodium 140 03/17/2017 11:49 AM    Potassium 4.3 03/17/2017 11:49 AM    Chloride 97 03/17/2017 11:49 AM    CO2 24 03/17/2017 11:49 AM    Glucose 99 03/17/2017 11:49 AM    BUN 20 03/17/2017 11:49 AM    Creatinine 0.74 (L) 03/17/2017 11:49 AM    BUN/Creatinine ratio 27 (H) 03/17/2017 11:49 AM    GFR est  03/17/2017 11:49 AM    GFR est non- 03/17/2017 11:49 AM    Calcium 10.0 03/17/2017 11:49 AM      Wt Readings from Last 3 Encounters:   04/16/19 195 lb (88.5 kg)   10/16/18 195 lb 6.4 oz (88.6 kg)   04/17/18 192 lb 9.6 oz (87.4 kg)      BP Readings from Last 3 Encounters:   04/16/19 100/60   10/16/18 130/70   04/17/18 102/68      Current Outpatient Medications   Medication Sig    BYSTOLIC 5 mg tablet TAKE 1 TABLET DAILY    fluticasone (FLONASE) 50 mcg/actuation nasal spray by Both Nostrils route daily.  Cetirizine (ZYRTEC) 10 mg cap Take  by mouth daily.  aspirin (ASPIRIN) 325 mg tablet Take 325 mg by mouth daily.  multivitamin (ONE A DAY) tablet Take 1 Tab by mouth daily. No current facility-administered medications for this visit. Impression see above.       Written by Brittney Polo, as dictated by Renetta Arnett MD.

## 2019-04-16 NOTE — PROGRESS NOTES
Visit Vitals  /60 (BP 1 Location: Left arm, BP Patient Position: Sitting)   Pulse 91   Resp 16   Ht 6' 3\" (1.905 m)   Wt 195 lb (88.5 kg)   SpO2 99%   BMI 24.37 kg/m²

## 2019-04-16 NOTE — Clinical Note
4/19/19 Patient: Madhuri Dumont YOB: 1960 Date of Visit: 4/16/2019 Quynh Love MD 
[address] [place] [id number] VIA Facsimile: [phone] Dear Quynh Love MD, Thank you for referring Mr. Madhuri Dumont to [facility] [address] [place] [address] for evaluation. My notes for this consultation are attached. If you have questions, please do not hesitate to call me. I look forward to following your patient along with you.  
 
 
Sincerely, 
 
Ramirez Carlton MD

## 2019-05-17 ENCOUNTER — OFFICE VISIT (OUTPATIENT)
Dept: CARDIOLOGY CLINIC | Age: 59
End: 2019-05-17

## 2019-05-17 DIAGNOSIS — Z95.818 STATUS POST PLACEMENT OF IMPLANTABLE LOOP RECORDER: Primary | ICD-10-CM

## 2019-07-02 ENCOUNTER — OFFICE VISIT (OUTPATIENT)
Dept: CARDIOLOGY CLINIC | Age: 59
End: 2019-07-02

## 2019-07-02 DIAGNOSIS — Z95.818 STATUS POST PLACEMENT OF IMPLANTABLE LOOP RECORDER: Primary | ICD-10-CM

## 2019-08-15 ENCOUNTER — OFFICE VISIT (OUTPATIENT)
Dept: CARDIOLOGY CLINIC | Age: 59
End: 2019-08-15

## 2019-08-15 DIAGNOSIS — Z95.818 STATUS POST PLACEMENT OF IMPLANTABLE LOOP RECORDER: Primary | ICD-10-CM

## 2019-09-12 DIAGNOSIS — I42.1 HYPERTROPHIC OBSTRUCTIVE CARDIOMYOPATHY (HOCM) (HCC): ICD-10-CM

## 2019-09-12 RX ORDER — NEBIVOLOL 5 MG/1
5 TABLET ORAL DAILY
Qty: 90 TAB | Refills: 3 | Status: SHIPPED | OUTPATIENT
Start: 2019-09-12 | End: 2019-10-01

## 2019-09-12 NOTE — TELEPHONE ENCOUNTER
Request for Bystolic 5mg daily. Last office visit 4-16-19, next office visit 10-15-19.  Refills per verbal order from Dr. Rina Berman.

## 2019-09-13 RX ORDER — NEBIVOLOL HYDROCHLORIDE 5 MG/1
TABLET ORAL
Qty: 90 TAB | Refills: 3 | OUTPATIENT
Start: 2019-09-13

## 2019-10-01 ENCOUNTER — TELEPHONE (OUTPATIENT)
Dept: CARDIOLOGY CLINIC | Age: 59
End: 2019-10-01

## 2019-10-01 DIAGNOSIS — I42.1 HYPERTROPHIC OBSTRUCTIVE CARDIOMYOPATHY (HOCM) (HCC): ICD-10-CM

## 2019-10-01 RX ORDER — NEBIVOLOL 5 MG/1
5 TABLET ORAL DAILY
Qty: 90 TAB | Refills: 3 | Status: SHIPPED | OUTPATIENT
Start: 2019-10-01 | End: 2020-09-25

## 2019-10-01 NOTE — TELEPHONE ENCOUNTER
Spoke to [facility] and completed verbal PA #[id number] approved until 9-20-20. Prescription resent.

## 2019-10-24 ENCOUNTER — OFFICE VISIT (OUTPATIENT)
Dept: CARDIOLOGY CLINIC | Age: 59
End: 2019-10-24

## 2019-10-24 VITALS
HEIGHT: 75 IN | DIASTOLIC BLOOD PRESSURE: 82 MMHG | SYSTOLIC BLOOD PRESSURE: 138 MMHG | BODY MASS INDEX: 23.4 KG/M2 | HEART RATE: 64 BPM | OXYGEN SATURATION: 99 % | WEIGHT: 188.2 LBS

## 2019-10-24 DIAGNOSIS — I34.89 SYSTOLIC ANTERIOR MOVEMENT OF MITRAL VALVE: ICD-10-CM

## 2019-10-24 DIAGNOSIS — I42.1 HYPERTROPHIC OBSTRUCTIVE CARDIOMYOPATHY (HOCM) (HCC): Primary | ICD-10-CM

## 2019-10-24 DIAGNOSIS — I34.0 NON-RHEUMATIC MITRAL REGURGITATION: ICD-10-CM

## 2019-10-24 DIAGNOSIS — I49.3 SYMPTOMATIC PVCS: ICD-10-CM

## 2019-10-24 DIAGNOSIS — I48.0 PAF (PAROXYSMAL ATRIAL FIBRILLATION) (HCC): ICD-10-CM

## 2019-10-24 DIAGNOSIS — Z95.818 STATUS POST PLACEMENT OF IMPLANTABLE LOOP RECORDER: ICD-10-CM

## 2019-10-24 DIAGNOSIS — Z82.49 FAMILY HISTORY OF CARDIAC ARREST: ICD-10-CM

## 2019-10-24 DIAGNOSIS — E78.00 HYPERCHOLESTEREMIA: ICD-10-CM

## 2019-10-24 RX ORDER — CETIRIZINE HCL 10 MG
10 TABLET ORAL DAILY
COMMUNITY

## 2019-10-24 NOTE — Clinical Note
10/24/19 Patient: Senait Dooley YOB: 1960 Date of Visit: 10/24/2019 Kee Nieto MD 
[address] [place] [id number] VIA Facsimile: [phone] Dear Kee Nieto MD, Thank you for referring Mr. Senait Dooley to [address] [place] [address] for evaluation. My notes for this consultation are attached. If you have questions, please do not hesitate to call me. I look forward to following your patient along with you.  
 
 
Sincerely, 
 
Jose Rafael Lima MD

## 2019-10-24 NOTE — PROGRESS NOTES
Ania Zaldivar     1960       Grant Paulino MD, [facility]  Date of Visit-10/24/2019   PCP is Mariama Lea MD   [facility] [place]  [facility]  HPI:  Ania Zaldivar is a 63 y.o. male   · F/u of 6 months. Has a hx of IHSS with PVCs. His daughter has had sudden death with revival withARVCD. His family has been tested and no other members of the family have [address] or ARVCD.   · Has IRL with brief AFib  · Had few PVCs, they have resolved as he has limited salt and caffeine  ·  The MRI confirmed IHSS with no scarring, wall thickness inferior 19 mm basal anterior septum 16 mm. There was an elongated MV leaflet causing mild JUNIE, based on this we recommended that he engage in non competitive athletics. Stress test 7/18/17 he walked for 11 minutes to a peak HR of 152 which is 92%. Pt notes that September 30th he started experiencing fever, chills, stomach cramps, and diarrhea. Pt states that these sx's lasted for about 8 days, so he went to his PCP, who could not figure it out but told him to go to the ER secondary to dehydration. Pt went to [facility] and was diagnosed with C-Diff. Pt states he lost 14 lbs. Pt is going to see a gastroenterologist. Pt states that from a cardiac standpoint, he is doing well. Pt denies any dizziness upon standing. Pt states he was off of his medicines for at least a week during that period, but is since back on them. Pt is no longer taking his ASA. Denies chest pain, edema, syncope or shortness of breath at rest, has no tachycardia, palpitations or sense of arrhythmia. EKG: SR, PRWP    Assessment/Plan:     1. IHSS   ----confirmed by MRI with normal LV function and has JUNIE with lack of scar Dr. Lucia Penny feels no defibrillator is needed.  ==== Pt avoids competitive sports and IRL has shown no ventricular arrhythmia. ------Stress ECHO WNL. Murmur unchanged       2. PAF:   -----He has taken himself off ASA.  We discussed the risk.  No OAC planned as low risk  3. PVCs   ----now asymptomatic. 4. Fam HX of cardiac arrest:  ---- daughter has gotten a sympathetectomy and doing well. 5.  Non rheumatic mitral regurgitation with systolic anterior motion of mitral valve leaflet. Future Appointments   Date Time Provider Johnathon Saxena   4/29/2020  4:20 PM La Manuel  [address]      F/u in 6 months     Impression:   1. Hypertrophic obstructive cardiomyopathy (HOCM) ([facility]    2. PAF (paroxysmal atrial fibrillation) ([address]    3. Non-rheumatic mitral regurgitation    4. Systolic anterior movement of mitral valve    5. Hypercholesteremia    6. Symptomatic PVCs    7. Family history of cardiac arrest    8. Status post placement of implantable loop recorder       Cardiac History:   MRI 2017=  1. Hypertrophic cardiomyopathy with severe hypertrophy of the mid inferoseptal  wall [id number] mm and moderate hypertrophy of the basal anteroseptal wall  measuring 16 mm. Hyperdynamic left ventricular systolic function. LVEF 75%. 2. There is no resting left ventricular outflow tract obstruction with systolic  anterior motion of the anterior mitral valve leaflet and anterior chordae. 3. Normal right ventricular size and systolic function. RVEF 60%. 4. Elongated anterior mitral valve leaflet otherwise normal MV structure. Systolic anterior motion of the anterior mitral valve leaflet and anterior  chordae causing significant resting left ventricular outflow tract obstruction. Mild to moderate 1-2+ mitral regurgitation. 5. On LGE study, there is no myocardial enhancement. There is no myocardial  scar. There is no features of recent or old myocardial infarction. There is no  features of infiltrative sarcoidosis or amyloidosis. All myocardial walls are  completely viable. His is typical expression of HCM with two discrete areas of hypertrophy along with resting LVOT obstruction.      Echo 2-23-16=dynamic obstruction during [address] in the  outflow tract with a peak velocity of 3.8 cm/s and a peak gradient of 60  mmHg. There was dynamic obstruction at rest in the outflow tract with a  peak velocity 3.3 m/s and a peak gradient of 45 mmHg. Systolic function  was normal. Ejection fraction 55 % to 65 %. Wall thickness was  mildly to moderately increased  Mitral valve: mild regurgitation. Holter Monitor, 2012: PVCs , short run of bigeminy but no VT.    ECHO 12 LVEF of 75 % severe asymmetric hypertrophy of the septum. dynamic obstruction during Valsalva in the outflow tract, peak velocity of 3.6 cm/s and a peak gradient of 50 mmHg. IVS= 17 mm Pw= 9 mm.   mild mitral valve regurgitation systolic anterior motion (JUNIE)of the chordal structures. JENNIFER gno evidence of ischemia with the patient walking 9 minutes on the David Protocol and achieving 94% of MPHR. MRI which was terminated early on 13 due to a claustrophobic reaction demonstrated an LVEF of 76% with mild septal hypertrophy. There was a very steep aorto-septal angle of 107 degrees due to the sigmoid septum and the steep angulation of the aortic root creating an unfavorable mitral valve apparatus apposition with the septum leading to significant JUNIE of the chordae and left ventricular outflow tract obstruction. Social History: Nonsmoker. . Works as an , has five children. He is  and does a lot of activities with his children. Family History: maternal uncle with a history of CABG and MI who  at age 46 and a maternal grandfather with known coronary disease and \"heart attacks\" who  at age 50. Daughter had sudden collapse, cardiac arrest with exercising, seen at [facility] [place] [place]. ROS-except as noted above. . A complete cardiac and respiratory are reviewed and negative except as above ; Resp-denies wheezing  or productive cough,.  Const- No unusual weight loss or fever; Neuro-no recent seizure or CVA ; GI- No BRBPR, abdom pain, bloating ; - no hematuria   see supplement sheet, initialed and to be scanned by staff  Past Medical History:   Diagnosis Date    Asymmetric septal hypertrophy ([facility]     MRI March 2013 and echo    Family history of cardiac arrest     luiser on treadmill, followed by EP at [facility] Dr Gagan Moraes Hypertrophic cardiomyopathy ([facility]     dynamic obstruction during Valsalva in the outflow tract, peak velocity of 3.6 cm/s and a peak gradient of 50 mmHg    Mitral regurgitation     PAF (paroxysmal atrial fibrillation) ([facility] 08/6/4113    Systolic anterior movement of mitral valve     JUNIE      Social Hx= reports that he has never smoked. He has never used smokeless tobacco. He reports that he drinks alcohol. Exam and Labs:  /82 (BP 1 Location: Right arm, BP Patient Position: Sitting)   Pulse 64   Ht 6' 3\" (1.905 m)   Wt 188 lb 3.2 oz (85.4 kg)   SpO2 99%   BMI 23.52 kg/m² Constitutional:  NAD, comfortable  Head: NC,AT. Eyes: No scleral icterus. Neck:  Neck supple. No JVD present. Throat: moist mucous membranes. Chest: Effort normal & normal respiratory excursion . Neurological: alert, conversant and oriented . Skin: Skin is not cold. No obvious systemic rash noted. Not diaphoretic. No erythema. Psychiatric:  Grossly normal mood and affect. Behavior appears normal. Extremities:  no clubbing or cyanosis. Abdomen: non distended    Lungs:breath sounds normal. No stridor. distress, wheezes or  Rales. Heart:systolic murmur late peaking 2/6 soft loudest at the LUSB normal rate, regular rhythm, normal S1, S2, no rubs, clicks or gallops , PMI non displaced. Edema: Edema is none.   No results found for: CHOL, CHOLX, CHLST, CHOLV, HDL, HDLP, LDL, LDLC, DLDLP, TGLX, TRIGL, TRIGP, CHHD, CHHDX  Lab Results   Component Value Date/Time    Sodium 140 03/17/2017 11:49 AM    Potassium 4.3 03/17/2017 11:49 AM    Chloride 97 03/17/2017 11:49 AM    CO2 24 03/17/2017 11:49 AM    Glucose 99 03/17/2017 11:49 AM    BUN 20 03/17/2017 11:49 AM    Creatinine 0.74 (L) 03/17/2017 11:49 AM    BUN/Creatinine ratio 27 (H) 03/17/2017 11:49 AM    GFR est  03/17/2017 11:49 AM    GFR est non- 03/17/2017 11:49 AM    Calcium 10.0 03/17/2017 11:49 AM      Wt Readings from Last 3 Encounters:   10/24/19 188 lb 3.2 oz (85.4 kg)   04/16/19 195 lb (88.5 kg)   10/16/18 195 lb 6.4 oz (88.6 kg)      BP Readings from Last 3 Encounters:   10/24/19 138/82   04/16/19 100/60   10/16/18 130/70      Current Outpatient Medications   Medication Sig    cetirizine (ZYRTEC) 10 mg tablet Take 10 mg by mouth daily.  nebivolol (BYSTOLIC) 5 mg tablet Take 1 Tab by mouth daily.  multivitamin (ONE A DAY) tablet Take 1 Tab by mouth daily. No current facility-administered medications for this visit. Impression see above.       Written by Ej Hastings, as dictated by Andres Allred MD.

## 2019-10-24 NOTE — PROGRESS NOTES
Visit Vitals  /82 (BP 1 Location: Right arm, BP Patient Position: Sitting)   Pulse 64   Ht 6' 3\" (1.905 m)   Wt 188 lb 3.2 oz (85.4 kg)   SpO2 99%   BMI 23.52 kg/m²

## 2019-10-28 ENCOUNTER — OFFICE VISIT (OUTPATIENT)
Dept: CARDIOLOGY CLINIC | Age: 59
End: 2019-10-28

## 2019-10-28 DIAGNOSIS — Z95.818 STATUS POST PLACEMENT OF IMPLANTABLE LOOP RECORDER: Primary | ICD-10-CM

## 2019-12-02 ENCOUNTER — OFFICE VISIT (OUTPATIENT)
Dept: CARDIOLOGY CLINIC | Age: 59
End: 2019-12-02

## 2019-12-02 DIAGNOSIS — Z95.818 STATUS POST PLACEMENT OF IMPLANTABLE LOOP RECORDER: Primary | ICD-10-CM

## 2020-01-03 ENCOUNTER — OFFICE VISIT (OUTPATIENT)
Dept: CARDIOLOGY CLINIC | Age: 60
End: 2020-01-03

## 2020-01-03 DIAGNOSIS — Z95.818 STATUS POST PLACEMENT OF IMPLANTABLE LOOP RECORDER: Primary | ICD-10-CM

## 2020-02-11 ENCOUNTER — OFFICE VISIT (OUTPATIENT)
Dept: CARDIOLOGY CLINIC | Age: 60
End: 2020-02-11

## 2020-02-11 DIAGNOSIS — Z95.818 STATUS POST PLACEMENT OF IMPLANTABLE LOOP RECORDER: Primary | ICD-10-CM

## 2020-02-14 NOTE — ROUTINE PROCESS
Cardiac Cath Lab Recovery Arrival Note:      Sachin Brodyist arrived to Cardiac Cath Lab, Recovery Area. Staff introduced to patient. Patient identifiers verified with NAME and DATE OF BIRTH. Procedure verified with patient. Consent forms reviewed and signed by patient or authorized representative and verified. Allergies verified. Patient and family oriented to department. Patient and family informed of procedure and plan of care. Questions answered with review. Patient prepped for procedure, per orders from physician, prior to arrival.    Patient on cardiac monitor, non-invasive blood pressure, SPO2 monitor. On RA. Patient is A&Ox 4. Patient reports no pain. Patient in stretcher, in low position, with side rails up, call bell within reach, patient instructed to call if assistance as needed. Patient prep in: [address], [place] [address]. Patient family has pager #   Family in: . Prep by: ZAY Stephens RN
Statement Selected

## 2020-03-06 ENCOUNTER — OFFICE VISIT (OUTPATIENT)
Dept: CARDIOLOGY CLINIC | Age: 60
End: 2020-03-06

## 2020-03-06 DIAGNOSIS — Z95.818 STATUS POST PLACEMENT OF IMPLANTABLE LOOP RECORDER: Primary | ICD-10-CM

## 2020-04-07 ENCOUNTER — OFFICE VISIT (OUTPATIENT)
Dept: CARDIOLOGY CLINIC | Age: 60
End: 2020-04-07

## 2020-04-07 DIAGNOSIS — Z95.818 STATUS POST PLACEMENT OF IMPLANTABLE LOOP RECORDER: Primary | ICD-10-CM

## 2020-05-05 ENCOUNTER — VIRTUAL VISIT (OUTPATIENT)
Dept: CARDIOLOGY CLINIC | Age: 60
End: 2020-05-05

## 2020-05-05 VITALS
BODY MASS INDEX: 23.52 KG/M2 | DIASTOLIC BLOOD PRESSURE: 73 MMHG | SYSTOLIC BLOOD PRESSURE: 113 MMHG | HEART RATE: 51 BPM | HEIGHT: 75 IN

## 2020-05-05 DIAGNOSIS — E78.00 HYPERCHOLESTEREMIA: ICD-10-CM

## 2020-05-05 DIAGNOSIS — Z95.818 STATUS POST PLACEMENT OF IMPLANTABLE LOOP RECORDER: ICD-10-CM

## 2020-05-05 DIAGNOSIS — I42.1 HYPERTROPHIC OBSTRUCTIVE CARDIOMYOPATHY (HOCM) (HCC): Primary | ICD-10-CM

## 2020-05-05 DIAGNOSIS — I34.0 NON-RHEUMATIC MITRAL REGURGITATION: ICD-10-CM

## 2020-05-05 DIAGNOSIS — I49.3 SYMPTOMATIC PVCS: ICD-10-CM

## 2020-05-05 DIAGNOSIS — Z82.49 FAMILY HISTORY OF CARDIAC ARREST: ICD-10-CM

## 2020-05-05 DIAGNOSIS — I48.0 PAF (PAROXYSMAL ATRIAL FIBRILLATION) (HCC): ICD-10-CM

## 2020-05-05 NOTE — PROGRESS NOTES
Doris Shane     1960       Grant Gonzales MD, [facility]  Date of Visit-5/5/2020   PCP is Brigitte Metz MD   [facility] [place]  [facility]  Virtual Visit  HPI:  Doris Shane is a 63 y.o. male   who was seen by synchronous (real-time) audio-video technology on 5/5/2020. Working from home   Less exercise  About a month ago, gets some 3 week lasting pain in upper shoulder when turns his head  Sounds like musculoskeletal    Never with workign out  Dizzy occ -some after pushups  blood pressure 113/73 51  No edema or palps  Overall doing well    · Has a hx of IHSS with PVCs. His daughter has had sudden death with revival withARVCD. His family has been tested and no other members of the family have [address] or ARVCD.   · Has IRL with brief AFib  · Had few PVCs, they have resolved as he has limited salt and caffeine  ·  The MRI confirmed IHSS with no scarring, wall thickness inferior 19 mm basal anterior septum 16 mm. There was an elongated MV leaflet causing mild JUNIE, based on this we recommended that he engage in non competitive athletics. Stress test 7/18/17 he walked for 11 minutes to a peak HR of 152 which is 92%. Pt notes that September 30th 2019 he started experiencing fever, chills, stomach cramps, and diarrhea. Pt states that these sx's lasted for about 8 days, so he went to his PCP, who could not figure it out but told him to go to the ER secondary to dehydration. Pt went to [facility] and was diagnosed with C-Diff. Pt states he lost 14 lbs. Assessment/Plan:     1. Hypertrophic obstructive cardiomyopathy (HOCM) (HCC)  #-Confirmed by MRI with normal LV function and has S.A.M. but no scar he is doing well clinically with no symptoms he has been a little less sedentary than most during the pandemic. Stress echo was normal with no demonstrated ventricular arrhythmia and he has a known prior murmur.     2. PAF (paroxysmal atrial fibrillation) ([facility]  Infrequent no [address] patient is declined aspirin  No arrhythmia on ILR    3. Symptomatic PVCs  No longer symptomatic    4. Family history of cardiac arrest  Daughter with sudden cardiac arrest    5. Non-rheumatic mitral regurgitation  Has systolic anterior motion of mitral valve    6. Status post placement of implantable loop recorder  Monitoring for arrhythmia none seen  Future Appointments   Date Time Provider Johnathon Saxena   11/6/2020  8:40 AM Malvin Orourke MD [address] 6 months , stable   This note was created using voice recognition software. Despite editing, there may be syntax errors. Impression:   1. Hypertrophic obstructive cardiomyopathy (HOCM) ([facility]    2. PAF (paroxysmal atrial fibrillation) ([facility]    3. Symptomatic PVCs    4. Family history of cardiac arrest    5. Non-rheumatic mitral regurgitation    6. Status post placement of implantable loop recorder    7. Hypercholesteremia         Key CAD CHF Meds             nebivolol (BYSTOLIC) 5 mg tablet (Taking) Take 1 Tab by mouth daily. Cardiac History:   MRI 2017=  1. Hypertrophic cardiomyopathy with severe hypertrophy of the mid inferoseptal  wall [id number] mm and moderate hypertrophy of the basal anteroseptal wall  measuring 16 mm. Hyperdynamic left ventricular systolic function. LVEF 75%. 2. There is no resting left ventricular outflow tract obstruction with systolic  anterior motion of the anterior mitral valve leaflet and anterior chordae. 3. Normal right ventricular size and systolic function. RVEF 60%. 4. Elongated anterior mitral valve leaflet otherwise normal MV structure. Systolic anterior motion of the anterior mitral valve leaflet and anterior  chordae causing significant resting left ventricular outflow tract obstruction. Mild to moderate 1-2+ mitral regurgitation. 5. On LGE study, there is no myocardial enhancement. There is no myocardial  scar.  There is no features of recent or old myocardial infarction. There is no  features of infiltrative sarcoidosis or amyloidosis. All myocardial walls are  completely viable. His is typical expression of HCM with two discrete areas of hypertrophy along with resting LVOT obstruction. Echo 16=dynamic obstruction during Valslava in the  outflow tract with a peak velocity of 3.8 cm/s and a peak gradient of 60  mmHg. There was dynamic obstruction at rest in the outflow tract with a  peak velocity 3.3 m/s and a peak gradient of 45 mmHg. Systolic function  was normal. Ejection fraction 55 % to 65 %. Wall thickness was  mildly to moderately increased  Mitral valve: mild regurgitation. Holter Monitor, 2012: PVCs , short run of bigeminy but no VT.    ECHO 12 LVEF of 75 % severe asymmetric hypertrophy of the septum. dynamic obstruction during Valsalva in the outflow tract, peak velocity of 3.6 cm/s and a peak gradient of 50 mmHg. IVS= 17 mm Pw= 9 mm.   mild mitral valve regurgitation systolic anterior motion (JUNIE)of the chordal structures. JENNIFER gno evidence of ischemia with the patient walking 9 minutes on the David Protocol and achieving 94% of MPHR. MRI which was terminated early on 13 due to a claustrophobic reaction demonstrated an LVEF of 76% with mild septal hypertrophy. There was a very steep aorto-septal angle of 107 degrees due to the sigmoid septum and the steep angulation of the aortic root creating an unfavorable mitral valve apparatus apposition with the septum leading to significant JUNIE of the chordae and left ventricular outflow tract obstruction. Social History: Nonsmoker. . Works as an , has five children. He is  and does a lot of activities with his children. Family History: maternal uncle with a history of CABG and MI who  at age 46 and a maternal grandfather with known coronary disease and \"heart attacks\" who  at age 50.  Daughter had sudden collapse, cardiac arrest with exercising, seen [facility]. ROS-except as noted above. . A complete cardiac and respiratory are reviewed and negative except as above ; Resp-denies wheezing  or productive cough,. Const- No unusual weight loss or fever; Neuro-no recent seizure or CVA ; GI- No BRBPR, abdom pain, bloating ; - no  hematuria     Past Medical History:   Diagnosis Date    Asymmetric septal hypertrophy ([facility]     MRI March 2013 and echo    Family history of cardiac arrest     daugher on treadmill, followed by EP [facility] Hypertrophic cardiomyopathy ([facility]     dynamic obstruction during Valsalva in the outflow tract, peak velocity of 3.6 cm/s and a peak gradient of 50 mmHg    Mitral regurgitation     PAF (paroxysmal atrial fibrillation) ([facility] 70/1/4339    Systolic anterior movement of mitral valve     JUNIE        Social Hx= reports that he has never smoked. He has never used smokeless tobacco. He reports current alcohol use. Due to this being a TeleHealth evaluation, many elements of the physical examination are unable to be assessed. General: Well developed, in no acute distress, cooperative and alert  HEENT: Pupils equal/round. No marked JVD visible on video. Respiratory: No audible wheezing, no signs of respiratory distress, lips non cyanotic  Extremities:  No edema  Neuro: A&Ox3, speech clear, no facial droop, answering questions appropriately  Skin: Skin color is normal. No rashes or lesions.  Non diaphoretic on visible skin during exam      No results found for: CHOL, CHOLX, CHLST, CHOLV, HDL, HDLP, LDL, LDLC, DLDLP, TGLX, TRIGL, TRIGP, CHHD, CHHDX  Lab Results   Component Value Date/Time    Sodium 140 03/17/2017 11:49 AM    Potassium 4.3 03/17/2017 11:49 AM    Chloride 97 03/17/2017 11:49 AM    CO2 24 03/17/2017 11:49 AM    Glucose 99 03/17/2017 11:49 AM    BUN 20 03/17/2017 11:49 AM    Creatinine 0.74 (L) 03/17/2017 11:49 AM    BUN/Creatinine ratio 27 (H) 03/17/2017 11:49 AM GFR est  03/17/2017 11:49 AM    GFR est non- 03/17/2017 11:49 AM    Calcium 10.0 03/17/2017 11:49 AM      Wt Readings from Last 3 Encounters:   10/24/19 188 lb 3.2 oz (85.4 kg)   04/16/19 195 lb (88.5 kg)   10/16/18 195 lb 6.4 oz (88.6 kg)      BP Readings from Last 3 Encounters:   05/05/20 113/73   10/24/19 138/82   04/16/19 100/60        Current Outpatient Medications   Medication Sig    cetirizine (ZYRTEC) 10 mg tablet Take 10 mg by mouth daily.  nebivolol (BYSTOLIC) 5 mg tablet Take 1 Tab by mouth daily.  multivitamin (ONE A DAY) tablet Take 1 Tab by mouth daily. No current facility-administered medications for this visit. Impression see above. VIRTUAL VISIT DOCUMENTATION     Pursuant to the emergency declaration under the [facility], [facility] waiver authority and the [employer] and [employer] Act, this Virtual  Visit was conducted, with patient's consent, to reduce the patient's risk of exposure to COVID-19 and provide continuity of care for an established patient. Services were provided through a video synchronous discussion virtually to substitute for in-person clinic visit. We discussed the expected course, resolution and complications of the diagnosis(es) in detail. Medication risks, benefits, costs, interactions, and alternatives were discussed as indicated. I advised him to contact the office if his condition worsens, changes or fails to improve as anticipated. He expressed understanding with the diagnosis(es) and plan    I have reviewed the nurses notes, vitals, problem list, allergy list, medical history, family, social history and medications. FOLLOW-UP            Patient was made aware and verbalized understanding that an appointment will be scheduled for them for a virtual visit and/or office visit within the above time frame.  Patient understanding his/her responsibility to call and change time/date if he/she so chooses. MD Farhat Mendoza [facility].  [address] [facility], [address], [address], [facility]. Chance Mayfield.    Cristhian Mayo  [phone] / [phone] Fax  [phone] / [phone] Fax        Greater than 20 minutes was spent in direct video patient care, planning and chart review. This visit was conducted using [employer] telemedicine services.

## 2020-05-11 ENCOUNTER — OFFICE VISIT (OUTPATIENT)
Dept: CARDIOLOGY CLINIC | Age: 60
End: 2020-05-11

## 2020-05-11 DIAGNOSIS — Z95.818 STATUS POST PLACEMENT OF IMPLANTABLE LOOP RECORDER: Primary | ICD-10-CM

## 2020-06-05 ENCOUNTER — OFFICE VISIT (OUTPATIENT)
Dept: CARDIOLOGY CLINIC | Age: 60
End: 2020-06-05

## 2020-06-05 DIAGNOSIS — Z95.818 STATUS POST PLACEMENT OF IMPLANTABLE LOOP RECORDER: Primary | ICD-10-CM

## 2020-07-08 ENCOUNTER — OFFICE VISIT (OUTPATIENT)
Dept: CARDIOLOGY CLINIC | Age: 60
End: 2020-07-08

## 2020-07-08 DIAGNOSIS — Z95.818 STATUS POST PLACEMENT OF IMPLANTABLE LOOP RECORDER: Primary | ICD-10-CM

## 2020-08-25 ENCOUNTER — TELEPHONE (OUTPATIENT)
Dept: CARDIOLOGY CLINIC | Age: 60
End: 2020-08-25

## 2020-08-25 NOTE — TELEPHONE ENCOUNTER
LVM for pt notifying him that his ILR has reached low battery. Explained that he can keep it in or if he wants it taken out to call our office and we can get that scheduled for him.

## 2020-08-25 NOTE — TELEPHONE ENCOUNTER
Returned pt call. He notified me that he would like his ILR removed. I told him Dr Cris Timmons nurse would be in touch to schedule this. Pt verbalized understanding and had no further questions.

## 2020-08-27 NOTE — TELEPHONE ENCOUNTER
Called pt two patient identifiers confirmed. Notified pt date and time for procedure will work. Notified pt we need to scheduled an appointment to see Dr. Denzel Marcelino before procedure. Scheduled him for 9/3/3030 for VV. Pt verbalized understanding of information discussed w/ no further questions at this time.

## 2020-08-27 NOTE — TELEPHONE ENCOUNTER
Called pt two patient identifiers confirmed. Scheduled pt for Implantable Loop Recorder removal on 9/25/2020. Pt stated the he wants to make sure he is not going to pay for any of this procedure. Pt stated that we are going to bill his insurance for everything and he will not be responsible. Pt wanted me to verify and get back to him.

## 2020-09-03 ENCOUNTER — VIRTUAL VISIT (OUTPATIENT)
Dept: CARDIOLOGY CLINIC | Age: 60
End: 2020-09-03
Payer: COMMERCIAL

## 2020-09-03 DIAGNOSIS — Z82.49 FAMILY HISTORY OF CARDIAC ARREST: ICD-10-CM

## 2020-09-03 DIAGNOSIS — I48.0 PAROXYSMAL ATRIAL FIBRILLATION (HCC): ICD-10-CM

## 2020-09-03 DIAGNOSIS — Z45.09 ENCOUNTER FOR LOOP RECORDER AT END OF BATTERY LIFE: Primary | ICD-10-CM

## 2020-09-03 DIAGNOSIS — I42.1 HYPERTROPHIC OBSTRUCTIVE CARDIOMYOPATHY (HOCM) (HCC): ICD-10-CM

## 2020-09-03 DIAGNOSIS — I34.89 SYSTOLIC ANTERIOR MOVEMENT OF MITRAL VALVE: ICD-10-CM

## 2020-09-03 PROCEDURE — 99213 OFFICE O/P EST LOW 20 MIN: CPT | Performed by: INTERNAL MEDICINE

## 2020-09-03 RX ORDER — LATANOPROST 50 UG/ML
1 SOLUTION/ DROPS OPHTHALMIC
COMMUNITY
Start: 2020-08-06

## 2020-09-04 ENCOUNTER — TELEPHONE (OUTPATIENT)
Dept: CARDIOLOGY CLINIC | Age: 60
End: 2020-09-04

## 2020-09-04 DIAGNOSIS — Z01.812 PRE-PROCEDURE LAB EXAM: ICD-10-CM

## 2020-09-04 DIAGNOSIS — I48.0 PAROXYSMAL ATRIAL FIBRILLATION (HCC): Primary | ICD-10-CM

## 2020-09-04 DIAGNOSIS — I48.0 PAROXYSMAL ATRIAL FIBRILLATION (HCC): ICD-10-CM

## 2020-09-04 NOTE — LETTER
9/4/2020 12:13 PM 
 
Mr. Eric Meadows [address] [facility] [id number] Your Implantable Loop Recorder Removal procedure has been scheduled for 9/25/20 at 12:00 pm, at [facility]. 
 
Please report to the \"Cell Phone\" Lot (please see attached information) by 10:00 am, or 2 hours prior to your scheduled procedure. Please bring a list of your current medications and medication bottles, if able, to the hospital on this day. You will be unable to drive after your procedure so please make sure to bring someone with you to your procedure. You will need to have nothing to eat or drink after midnight, the night prior to your procedure. You may have small sips of water, if needed, to take with your medication. You will need labs drawn prior to your procedure. Please go to [facility] to have this done as soon as you are able. You should not stop your medication prior to your scheduled procedure.  
 
 
Sincerely, 
 
 
Eduard Franklin MD

## 2020-09-04 NOTE — PROGRESS NOTES
Cardiac Electrophysiology VIRTUAL VISIT Note     Pursuant to the emergency declaration under the [facility] waiver authority and the [employer] and [employer] Act, this Virtual  Visit was conducted, with patient's consent, to reduce the patient's risk of exposure to COVID-19 and provide continuity of care for an established patient. Services were provided through an audio video synchronous discussion virtually to substitute for in-person clinic visit.   Subjective:      Yoel Meza is a 63 y.o. patient who is seen because the ILR has reached its end of life and he wants it be removed rather than leaving it as is  I had implanted this for him in March 2017 because if PVCs on holter and he has IHSS  on MRI and his daughter had survived sudden death but she had ARVC and not IHSS so he did not have ICD implanted  Since it was implanted he has had abnormal rhythms but not VT or VF    ILR with sinus tach and T wave oversensing 1/8-1/13/19, probably with exercise  Implantable loop recorder show an episode of sinus pause 3-4 seconds but asymptomatic   ILR showed PAF,  atrial flutter with RVR and aberrancy, more likely than VT    He has not had syncope  He has short PAF and atrial flutter and did not take NOAC or OAC    Before the ILR implant:    He has had 1000 or so of unifocal superior axis PVCs and they are relatively narrow consistent with inferoseptal origin  He has IHSS and since December he has felt PVCs despite taking bystolic  He has several echos and 2 stress echos  He has IHSS with high LVOT gradient with the most recent 2/2017 over 60 mmHg  On stress echo he had  mmHg but not sure if this is up or down during exercise based on report  He has no syncope or cardiac arrest  He has 5 children, 2 are fraternal twin  One of the twin was in [employer] and had VF arrest in the gym in 2012   She was transferred to [facility] and had S ICD by Dr Christina Davidson  She then has had 2 VF ICD shocks and is now told to go to [facility] as there is concern for ARVC. She was in [place] at the time of third cardiac arrest  Her twin sister and other 3 siblings have been seen by Dr Devang Boyd and pediatric cardiologist and he is told that they are fine   He is avid exercise person    Cardiac MRI: 3/2017  1. Hypertrophic cardiomyopathy with severe hypertrophy of the mid inferoseptal  wall [id number] mm and moderate hypertrophy of the basal anteroseptal wall  measuring 16 mm. Hyperdynamic left ventricular systolic function. LVEF 75%. 2. There is no resting left ventricular outflow tract obstruction with systolic  anterior motion of the anterior mitral valve leaflet and anterior chordae. 3. Normal right ventricular size and systolic function. RVEF 60%. 4. Elongated anterior mitral valve leaflet otherwise normal MV structure. Systolic anterior motion of the anterior mitral valve leaflet and anterior chordae causing significant resting left ventricular outflow tract obstruction. Mild to moderate 1-2+ mitral regurgitation. 5. On LGE study, there is no myocardial enhancement. There is no myocardial  scar. There is no features of recent or old myocardial infarction. There is no  features of infiltrative sarcoidosis or amyloidosis. All myocardial walls are  completely viable. 6. Normal pleura and pericardium.     Previous cardiac MRI 2013    Problem List  Date Reviewed: 9/4/2020          Codes Class Noted    PAF (paroxysmal atrial fibrillation) ([facility] ICD-10-CM: I48.0  ICD-9-CM: 427.31  11/5/2017        Status post placement of implantable loop recorder ICD-10-CM: Z95.818  ICD-9-CM: V45.09  3/22/2017    Overview Signed 3/22/2017  3:34 PM by Shakir Campos MD     3/22/2017             Symptomatic PVCs ICD-10-CM: I49.3  ICD-9-CM: 427.69  3/22/2017        Hypercholesteremia ICD-10-CM: E78.00  ICD-9-CM: 272.0  8/24/2016        Family history of cardiac arrest ICD-10-CM: Z82.49  ICD-9-CM: V17.49  Unknown    Overview Signed 11/25/2015 12:48 PM by MD светлана Bermudez on treadmill, followed by EP at [facility] Dr Ever Sanchez             Mitral regurgitation ICD-10-CM: I34.0  ICD-9-CM: 424.0  Unknown        Systolic anterior movement of mitral valve ICD-10-CM: I34.8  ICD-9-CM: 424.0  Unknown    Overview Signed 11/25/2015 12:49 PM by MD JUNIE Bermudez             Abnormal EKG ICD-10-CM: R94.31  ICD-9-CM: 794.31  11/21/2012        Hypertrophic obstructive cardiomyopathy (HOCM) ([facility] ICD-10-CM: I42.1  ICD-9-CM: 425.11  11/21/2012    Overview Signed 4/24/2013 11:08 AM by Nacho Robert     11/21/12  Echo:  JUNIE with 50 mmHg subaortic gradient, mild septal hypertrophy 1.4 cm. LVEF75%                   Current Outpatient Medications   Medication Sig Dispense Refill    latanoprost (XALATAN) 0.005 % ophthalmic solution       cetirizine (ZYRTEC) 10 mg tablet Take 10 mg by mouth daily.  nebivolol (BYSTOLIC) 5 mg tablet Take 1 Tab by mouth daily. 90 Tab 3    multivitamin (ONE A DAY) tablet Take 1 Tab by mouth daily. No Known Allergies  Past Medical History:   Diagnosis Date    Asymmetric septal hypertrophy ([facility]     MRI March 2013 and echo    Family history of cardiac arrest     светлана on treadmill, followed by EP at [facility] Dr Sherie Gamboa Hypertrophic cardiomyopathy ([facility]     dynamic obstruction during Valsalva in the outflow tract, peak velocity of 3.6 cm/s and a peak gradient of 50 mmHg    Mitral regurgitation     PA[facility] ([facility]oxysmal atrial fibrillation) [facility] 40/8/3371    Systolic anterior movement of mitral valve     JUNIE     No past surgical history      Social History     Tobacco Use    Smoking status: Never Smoker    Smokeless tobacco: Never Used   Substance Use Topics    Alcohol use:  Yes     Alcohol/week: 0.0 standard drinks     Comment: occ        Review of Systems:   Constitutional: Negative for fever, chills, weight loss, malaise/fatigue. HEENT: Negative for nosebleeds, vision changes. Respiratory: Negative for cough, hemoptysis  Cardiovascular: Negative for chest pain, orthopnea, claudication, leg swelling, syncope, and PND. Gastrointestinal: Negative for nausea, vomiting, diarrhea, blood in stool and melena. Genitourinary: Negative for dysuria, and hematuria. Musculoskeletal: Negative for myalgias, arthralgia. Skin: Negative for rash. Heme: Does not bleed or bruise easily. Neurological: Negative for speech change and focal weakness     Objective:   Due to this being a TeleHealth evaluation, many elements of the physical examination are unable to be assessed. General: Well developed, in no acute distress, cooperative and alert  HEENT: Pupils equal/round. No marked JVD visible on video. Respiratory: No audible wheezing, no signs of respiratory distress, lips non cyanotic  Extremities:  No edema  Neuro: A&Ox3, speech clear, no facial droop, answering questions appropriately  Skin: Skin color is normal. Non diaphoretic on visible skin during exam      Assessment/Plan:       ICD-10-CM ICD-9-CM    1. Encounter for loop recorder at end of battery life  Z45.09 V53.39    2. Paroxysmal atrial fibrillation (HCC)  I48.0 427.31    3. Family history of cardiac arrest  Z82.49 V17.49    4. Hypertrophic obstructive cardiomyopathy (HOCM) (HCC)  I42.1 425.11    5.  Systolic anterior movement of mitral valve  I34.8 424.0      His repeat MRI still does not meet criteria for ICD implant for primary prevention of sudden death  His daughter had survived cardiac arrest and had ICD for secondary prevention but she has ARVC and not IHSS  He has not have VT or VF  He wants to have the  ILR removed  We discussed risks and benefits and I even told him that he could keep the  ILR as is but he declined and wanted it out  He said he had checked with his PCP for removal in his PCP's office but was told it was not going to be done so he now agrees to have ILR removed at [facility]   he will need conscious sedation for this    Thank you for involving me in this patient's care and please call with further concerns or questions. Maxi Patricia M.D.   Electrophysiology/Cardiology  [facility] [place]  [facility], [address]  [phone]

## 2020-09-04 NOTE — TELEPHONE ENCOUNTER
Pt states he would like to go through with the removal of the loop recorder.  Please advise        [facility][phone]

## 2020-09-04 NOTE — TELEPHONE ENCOUNTER
Verified patient with two types of identifiers. Patient still agrees to to ILR removal on 9/25/20 at 12:00 pm. Notified patient will mail pre procedure instructions. Address verified. Patient verbalized understanding and will call with any other questions.

## 2020-09-16 RX ORDER — CEFAZOLIN SODIUM/WATER 2 G/20 ML
2 SYRINGE (ML) INTRAVENOUS ONCE
Status: CANCELLED | OUTPATIENT
Start: 2020-09-16 | End: 2020-09-16

## 2020-09-16 RX ORDER — SODIUM CHLORIDE 0.9 % (FLUSH) 0.9 %
5-40 SYRINGE (ML) INJECTION EVERY 8 HOURS
Status: CANCELLED | OUTPATIENT
Start: 2020-09-16

## 2020-09-16 RX ORDER — SODIUM CHLORIDE 0.9 % (FLUSH) 0.9 %
5-40 SYRINGE (ML) INJECTION AS NEEDED
Status: CANCELLED | OUTPATIENT
Start: 2020-09-16

## 2020-09-25 ENCOUNTER — HOSPITAL ENCOUNTER (OUTPATIENT)
Age: 60
Setting detail: OUTPATIENT SURGERY
Discharge: HOME | End: 2020-09-25
Attending: INTERNAL MEDICINE | Admitting: INTERNAL MEDICINE
Payer: COMMERCIAL

## 2020-09-25 VITALS
RESPIRATION RATE: 20 BRPM | OXYGEN SATURATION: 100 % | DIASTOLIC BLOOD PRESSURE: 66 MMHG | HEART RATE: 57 BPM | SYSTOLIC BLOOD PRESSURE: 143 MMHG

## 2020-09-25 DIAGNOSIS — Z45.09 ENCOUNTER FOR LOOP RECORDER AT END OF BATTERY LIFE: ICD-10-CM

## 2020-09-25 DIAGNOSIS — I48.0 PAROXYSMAL ATRIAL FIBRILLATION (HCC): ICD-10-CM

## 2020-09-25 DIAGNOSIS — Z98.890 HISTORY OF LOOP RECORDER: ICD-10-CM

## 2020-09-25 DIAGNOSIS — I42.1 HYPERTROPHIC OBSTRUCTIVE CARDIOMYOPATHY (HOCM) (HCC): ICD-10-CM

## 2020-09-25 LAB
BASOPHILS # BLD AUTO: 0.1 X10E3/UL (ref 0–0.2)
BASOPHILS NFR BLD AUTO: 1 %
BUN SERPL-MCNC: 14 MG/DL (ref 6–24)
BUN/CREAT SERPL: 13 (ref 9–20)
CALCIUM SERPL-MCNC: 9.9 MG/DL (ref 8.7–10.2)
CHLORIDE SERPL-SCNC: 100 MMOL/L (ref 96–106)
CO2 SERPL-SCNC: 26 MMOL/L (ref 20–29)
CREAT SERPL-MCNC: 1.06 MG/DL (ref 0.76–1.27)
EOSINOPHIL # BLD AUTO: 0.1 X10E3/UL (ref 0–0.4)
EOSINOPHIL NFR BLD AUTO: 1 %
ERYTHROCYTE [DISTWIDTH] IN BLOOD BY AUTOMATED COUNT: 12.9 % (ref 11.6–15.4)
GLUCOSE SERPL-MCNC: 96 MG/DL (ref 65–99)
HCT VFR BLD AUTO: 42.9 % (ref 37.5–51)
HGB BLD-MCNC: 14.9 G/DL (ref 13–17.7)
IMM GRANULOCYTES # BLD AUTO: 0 X10E3/UL (ref 0–0.1)
IMM GRANULOCYTES NFR BLD AUTO: 0 %
LYMPHOCYTES # BLD AUTO: 2.6 X10E3/UL (ref 0.7–3.1)
LYMPHOCYTES NFR BLD AUTO: 30 %
MCH RBC QN AUTO: 33.6 PG (ref 26.6–33)
MCHC RBC AUTO-ENTMCNC: 34.7 G/DL (ref 31.5–35.7)
MCV RBC AUTO: 97 FL (ref 79–97)
MONOCYTES # BLD AUTO: 0.8 X10E3/UL (ref 0.1–0.9)
MONOCYTES NFR BLD AUTO: 9 %
NEUTROPHILS # BLD AUTO: 5.1 X10E3/UL (ref 1.4–7)
NEUTROPHILS NFR BLD AUTO: 59 %
PLATELET # BLD AUTO: 246 X10E3/UL (ref 150–450)
POTASSIUM SERPL-SCNC: 4.2 MMOL/L (ref 3.5–5.2)
RBC # BLD AUTO: 4.44 X10E6/UL (ref 4.14–5.8)
SODIUM SERPL-SCNC: 141 MMOL/L (ref 134–144)
WBC # BLD AUTO: 8.6 X10E3/UL (ref 3.4–10.8)

## 2020-09-25 PROCEDURE — 77030010507 HC ADH SKN DERMBND J&J -B: Performed by: INTERNAL MEDICINE

## 2020-09-25 PROCEDURE — 33286 RMVL SUBQ CAR RHYTHM MNTR: CPT | Performed by: INTERNAL MEDICINE

## 2020-09-25 PROCEDURE — 77030031139 HC SUT VCRL2 J&J -A: Performed by: INTERNAL MEDICINE

## 2020-09-25 PROCEDURE — 74011000250 HC RX REV CODE- 250: Performed by: INTERNAL MEDICINE

## 2020-09-25 RX ORDER — SODIUM CHLORIDE 0.9 % (FLUSH) 0.9 %
5-40 SYRINGE (ML) INJECTION AS NEEDED
Status: DISCONTINUED | OUTPATIENT
Start: 2020-09-25 | End: 2020-09-28 | Stop reason: HOSPADM

## 2020-09-25 RX ORDER — CEPHALEXIN 500 MG/1
500 CAPSULE ORAL 3 TIMES DAILY
Qty: 9 CAP | Refills: 0 | Status: SHIPPED | OUTPATIENT
Start: 2020-09-25 | End: 2020-09-28

## 2020-09-25 RX ORDER — NEBIVOLOL 5 MG/1
5 TABLET ORAL DAILY
Qty: 90 TAB | Refills: 3 | Status: SHIPPED | OUTPATIENT
Start: 2020-09-25 | End: 2021-05-27 | Stop reason: SDUPTHER

## 2020-09-25 RX ORDER — CEFAZOLIN SODIUM/WATER 2 G/20 ML
2 SYRINGE (ML) INTRAVENOUS ONCE
Status: DISCONTINUED | OUTPATIENT
Start: 2020-09-25 | End: 2020-09-26 | Stop reason: HOSPADM

## 2020-09-25 RX ORDER — SODIUM CHLORIDE 0.9 % (FLUSH) 0.9 %
5-40 SYRINGE (ML) INJECTION EVERY 8 HOURS
Status: DISCONTINUED | OUTPATIENT
Start: 2020-09-25 | End: 2020-09-28 | Stop reason: HOSPADM

## 2020-09-25 NOTE — INTERVAL H&P NOTE
Update History & Physical 
 
The Patient's History and Physical of September 25 2020, was reviewed with the patient and I examined the patient. There was change. The patient's primary care physician cannot remove the implanted loop recorder so he wanted me to do it The surgical site was confirmed by the patient and me. Plan:  The risk, benefits, expected outcome, and alternative to the recommended procedure have been discussed with the patient. Patient understands and wants to proceed with the procedure.  
 
Electronically signed by Radha Obrien MD on 9/25/2020 at 11:08 AM

## 2020-09-25 NOTE — PROGRESS NOTES
Cardiac Cath Lab Recovery Arrival Note:      Raoul Duncan arrived to Cardiac Cath Lab, Recovery Area. Staff introduced to patient. Patient identifiers verified with NAME and DATE OF BIRTH. Procedure verified with patient. Consent forms reviewed and signed by patient or authorized representative and verified. Allergies verified. Patient and family oriented to department. Patient and family informed of procedure and plan of care. Questions answered with review. Patient prepped for procedure, per orders from physician, prior to arrival.    Patient on cardiac monitor, non-invasive blood pressure, SPO2 monitor. On RA. Patient is A&Ox 4. Patient reports no c/o's. Patient in stretcher, in low position, with side rails up, call bell within reach, patient instructed to call if assistance as needed. Patient prep in: [address], [place] [address]. Patient family has pager # na  Family in: na.   Prep by: SHUBHAM Robins

## 2020-09-25 NOTE — DISCHARGE INSTRUCTIONS
Discharge Instructions Post ILR Removal    1. You may remove your dressing on Monday. Do not peel or scrub skin glue. 2.  You may shower the day after your procedure. 3.  Follow up as noted below. 4.  A prescription for antibiotics was sent electronically to your pharmacy on record. Please pick it up & take as directed. Future Appointments   Date Time Provider Johnathon Saxena   11/6/2020  8:40 AM MD Jacqui Dale M.D.   Electrophysiology/Cardiology  [facility] [place]  [facility], [facility] [address] [place] [address]  [phone]

## 2020-09-25 NOTE — TELEPHONE ENCOUNTER
Request for bystolic 5mg daily. Last office visit 9-5-20, next office visit 11-6-20.  Refills per verbal order from Dr. Swapna Ahn.

## 2020-09-25 NOTE — Clinical Note
Dressed using topical skin adhesive dressing. Site: clean, dry, & intact, no bleeding and no hematoma. No

## 2020-09-25 NOTE — PROCEDURES
Cardiac Procedure Note   Patient: Mark Almaraz  MRN: [id number]  SSN: [id number]   YOB: 1960 Age: 63 y.o.   Sex: male    Date of Procedure: 9/25/2020   Pre-procedure Diagnosis: implantable loop recorder end of life, IHSS  Post-procedure Diagnosis: same  Procedure: loop recorder explant  :  Dr. Johnna Duff MD    Assistant(s):  None  Anesthesia: lidocaine  Estimated Blood Loss: Less than 10 mL   Specimens Removed: [employer] ILR  Findings: patient did not want conscious sedation   He wants lidocaine  Incision closed with 2-0 vicryl  Complications: None   Implants:  None  Signed by:  Johnna Duff MD  9/25/2020  1:40 PM

## 2020-09-25 NOTE — Clinical Note
Left chest clipped Wet prep solution applied at: 1256. Wet prep solution dried at: 1259. Wet prep elapsed drying time: 3 mins.

## 2020-11-06 ENCOUNTER — OFFICE VISIT (OUTPATIENT)
Dept: CARDIOLOGY CLINIC | Age: 60
End: 2020-11-06
Payer: COMMERCIAL

## 2020-11-06 VITALS
OXYGEN SATURATION: 98 % | SYSTOLIC BLOOD PRESSURE: 110 MMHG | WEIGHT: 188.8 LBS | HEART RATE: 68 BPM | RESPIRATION RATE: 15 BRPM | HEIGHT: 75 IN | BODY MASS INDEX: 23.48 KG/M2 | DIASTOLIC BLOOD PRESSURE: 70 MMHG

## 2020-11-06 DIAGNOSIS — Z95.818 STATUS POST PLACEMENT OF IMPLANTABLE LOOP RECORDER: ICD-10-CM

## 2020-11-06 DIAGNOSIS — I34.89 SYSTOLIC ANTERIOR MOVEMENT OF MITRAL VALVE: ICD-10-CM

## 2020-11-06 DIAGNOSIS — Z82.49 FAMILY HISTORY OF CARDIAC ARREST: ICD-10-CM

## 2020-11-06 DIAGNOSIS — I34.0 NON-RHEUMATIC MITRAL REGURGITATION: ICD-10-CM

## 2020-11-06 DIAGNOSIS — E78.00 HYPERCHOLESTEREMIA: ICD-10-CM

## 2020-11-06 DIAGNOSIS — I48.0 PAROXYSMAL ATRIAL FIBRILLATION (HCC): ICD-10-CM

## 2020-11-06 DIAGNOSIS — I42.1 HYPERTROPHIC OBSTRUCTIVE CARDIOMYOPATHY (HOCM) (HCC): Primary | ICD-10-CM

## 2020-11-06 DIAGNOSIS — I49.3 SYMPTOMATIC PVCS: ICD-10-CM

## 2020-11-06 PROCEDURE — 99214 OFFICE O/P EST MOD 30 MIN: CPT | Performed by: SPECIALIST

## 2020-11-06 NOTE — PROGRESS NOTES
Visit Vitals  /70 (BP 1 Location: Left arm, BP Patient Position: Sitting)   Pulse 68   Resp 15   Ht 6' 3\" (1.905 m)   Wt 188 lb 12.8 oz (85.6 kg)   SpO2 98%   BMI 23.60 kg/m²

## 2020-11-06 NOTE — PROGRESS NOTES
Iman Reese     1960       Grant Meza MD, [facility] [place]  Date of Visit-11/6/2020   PCP is Allen Mendoza MD   [facility] [place]  [facility]  HPI:  Iman Reese is a 63 y.o. male   6 month f/u. Had VV on 5/5/20. ILR removed on 9/25/20. · Has a hx of IHSS with PVCs. His daughter has had sudden death with revival withARVCD. His family has been tested and no other members of the family have [address] or ARVCD.   · Has IRL with brief AFib  · Had few PVCs, they have resolved as he has limited salt and caffeine  ·  The MRI confirmed IHSS with no scarring, wall thickness inferior 19 mm basal anterior septum 16 mm. There was an elongated MV leaflet causing mild JUNIE, based on this we recommended that he engage in non competitive athletics. · Stress test 7/18/17 he walked for 11 minutes to a peak HR of 152 which is 92%. Overall the pt states he is doing well. Pt is exercising regularly. He notes that he feels lightheaded/dizzy after exercise, but he regularly pushes himself during his workouts and he does not consider this an unusual or new sx. Denies chest pain, edema, syncope or shortness of breath at rest, has no tachycardia, palpitations or sense of arrhythmia. Assessment/Plan:    Patient Instructions   You have been scheduled for an echocardiogram. We will call with results and see you back for an annual follow up. Future Appointments   Date Time Provider Johnathon [place]essa   11/5/2021  9:00 AM Grant Michael MD CAVREY BS AMB         1. Hypertrophic obstructive cardiomyopathy (HOCM) (HCC)  ----confirmed by MRI with normal LV function and has JUNIE with lack of scar Dr. Fofana Medico defibrillator is needed.  ==== Pt avoids competitive sports and IRL has shown no ventricular arrhythmia. ------Stress ECHO WNL.   Murmur unchanged    2. Paroxysmal atrial fibrillation (HCC)  No recurrence. No OAC planned as is low risk and infrequent.      3. Non-rheumatic mitral regurgitation  Has systolic anterior motion. Needs repeat echo for gradient. 4. Systolic anterior movement of mitral valve  5. Hypercholesteremia  On medical therapy. At goal , denies excess muscle aches or new liver issues  Key Antihyperlipidemia Meds     The patient is on no antihyperlipidemia meds. No results found for: LDL, LDLC, DLDLP    6. Symptomatic PVCs  7. Family history of cardiac arrest  Daughter had VT arrest.   8. Status post placement of implantable loop recorder  Now removed. He is very happy about that. Echo, F/u in 1 year     Impression:   1. Hypertrophic obstructive cardiomyopathy (HOCM) (HCC)    2. Paroxysmal atrial fibrillation ([address]    3. Non-rheumatic mitral regurgitation    4. Systolic anterior movement of mitral valve    5. Hypercholesteremia    6. Symptomatic PVCs    7. Family history of cardiac arrest    8. Status post placement of implantable loop recorder       Cardiac History:   MRI 2017=  1. Hypertrophic cardiomyopathy with severe hypertrophy of the mid inferoseptal  wall [id number] mm and moderate hypertrophy of the basal anteroseptal wall  measuring 16 mm. Hyperdynamic left ventricular systolic function. LVEF 75%. 2. There is no resting left ventricular outflow tract obstruction with systolic  anterior motion of the anterior mitral valve leaflet and anterior chordae. 3. Normal right ventricular size and systolic function. RVEF 60%. 4. Elongated anterior mitral valve leaflet otherwise normal MV structure. Systolic anterior motion of the anterior mitral valve leaflet and anterior  chordae causing significant resting left ventricular outflow tract obstruction. Mild to moderate 1-2+ mitral regurgitation. 5. On LGE study, there is no myocardial enhancement. There is no myocardial  scar. There is no features of recent or old myocardial infarction. There is no  features of infiltrative sarcoidosis or amyloidosis.  All myocardial walls are  completely viable. His is typical expression of HCM with two discrete areas of hypertrophy along with resting LVOT obstruction. Echo 16=dynamic obstruction during Valslava in the  outflow tract with a peak velocity of 3.8 cm/s and a peak gradient of 60  mmHg. There was dynamic obstruction at rest in the outflow tract with a  peak velocity 3.3 m/s and a peak gradient of 45 mmHg. Systolic function  was normal. Ejection fraction 55 % to 65 %. Wall thickness was  mildly to moderately increased  Mitral valve: mild regurgitation. Holter Monitor, 2012: PVCs , short run of bigeminy but no VT.    ECHO 12 LVEF of 75 % severe asymmetric hypertrophy of the septum. dynamic obstruction during Valsalva in the outflow tract, peak velocity of 3.6 cm/s and a peak gradient of 50 mmHg. IVS= 17 mm Pw= 9 mm.   mild mitral valve regurgitation systolic anterior motion (JUNIE)of the chordal structures. JENNIFER gno evidence of ischemia with the patient walking 9 minutes on the David Protocol and achieving 94% of MPHR. MRI which was terminated early on 13 due to a claustrophobic reaction demonstrated an LVEF of 76% with mild septal hypertrophy. There was a very steep aorto-septal angle of 107 degrees due to the sigmoid septum and the steep angulation of the aortic root creating an unfavorable mitral valve apparatus apposition with the septum leading to significant JUNIE of the chordae and left ventricular outflow tract obstruction. Social History: Nonsmoker. . Works as an , has five children. He is  and does a lot of activities with his children. Family History: maternal uncle with a history of CABG and MI who  at age 46 and a maternal grandfather with known coronary disease and \"heart attacks\" who  at age 50. Daughter had sudden collapse, cardiac arrest with exercising, [place] [facility] ROS-except as noted above. . A complete cardiac and respiratory are reviewed and negative except as above ; Resp-denies wheezing  or productive cough,. Const- No unusual weight loss or fever; Neuro-no recent seizure or CVA ; GI- No BRBPR, abdom pain, bloating ; - no  hematuria   see supplement sheet, initialed and to be scanned by staff  Past Medical History:   Diagnosis Date    Asymmetric septal hypertrophy ([facility]     MRI March 2013 and echo    Family history of cardiac arrest     светлана on treadmill, followed by EP at [facility] Dr Armando Forrest Hypertrophic cardiomyopathy ([facility]     dynamic obstruction during Valsalva in the outflow tract, peak velocity of 3.6 cm/s and a peak gradient of 50 mmHg    Mitral regurgitation     PAF (paroxysmal atrial fibrillation) ([facility] 26/6/2580    Systolic anterior movement of mitral valve     JUNIE      Social Hx= reports that he has never smoked. He has never used smokeless tobacco. He reports current alcohol use. He reports that he does not use drugs. Exam and Labs:  /70 (BP 1 Location: Left arm, BP Patient Position: Sitting)   Pulse 68   Resp 15   Ht 6' 3\" (1.905 m)   Wt 188 lb 12.8 oz (85.6 kg)   SpO2 98%   BMI 23.60 kg/m² Constitutional:  NAD, comfortable  Head: NC,AT. Eyes: No scleral icterus. Neck:  Neck supple. No JVD present. Throat: moist mucous membranes. Chest: Effort normal & normal respiratory excursion . Neurological: alert, conversant and oriented . Skin: Skin is not cold. No obvious systemic rash noted. Not diaphoretic. No erythema. Psychiatric:  Grossly normal mood and affect. Behavior appears normal. Extremities:  no clubbing or cyanosis. Abdomen: non distended    Lungs:breath sounds normal. No stridor. distress, wheezes or  Rales. Heart:2-3/6 decrescendo harsh murmur throughout the LUSB, LLSB normal rate, regular rhythm, normal S1, S2, no rubs, clicks or gallops , PMI non displaced. Edema: Edema is none.   No results found for: CHOL, CHOLX, CHLST, CHOLV, HDL, HDLP, LDL, LDLC, DLDLP, TGLX, TRIGL, TRIGP, CHHD, [facility]  Lab Results   Component Value Date/Time    Sodium 141 09/24/2020 04:32 PM    Potassium 4.2 09/24/2020 04:32 PM    Chloride 100 09/24/2020 04:32 PM    CO2 26 09/24/2020 04:32 PM    Glucose 96 09/24/2020 04:32 PM    BUN 14 09/24/2020 04:32 PM    Creatinine 1.06 09/24/2020 04:32 PM    BUN/Creatinine ratio 13 09/24/2020 04:32 PM    GFR est AA 89 09/24/2020 04:32 PM    GFR est non-AA 77 09/24/2020 04:32 PM    Calcium 9.9 09/24/2020 04:32 PM      Wt Readings from Last 3 Encounters:   11/06/20 188 lb 12.8 oz (85.6 kg)   10/24/19 188 lb 3.2 oz (85.4 kg)   04/16/19 195 lb (88.5 kg)      BP Readings from Last 3 Encounters:   11/06/20 110/70   09/25/20 (!) 143/66   05/05/20 113/73      Current Outpatient Medications   Medication Sig    nebivoloL (Bystolic) 5 mg tablet Take 1 Tab by mouth daily.  latanoprost (XALATAN) 0.005 % ophthalmic solution Administer 1 Drop to both eyes nightly.  cetirizine (ZYRTEC) 10 mg tablet Take 10 mg by mouth daily.  multivitamin (ONE A DAY) tablet Take 1 Tab by mouth daily. No current facility-administered medications for this visit. Impression see above.       Written by [facility], as dictated by Maya Rodriguez MD.

## 2020-11-06 NOTE — PATIENT INSTRUCTIONS
You have been scheduled for an echocardiogram. We will call with results and see you back for an annual follow up.

## 2020-11-06 NOTE — Clinical Note
11/6/20 Patient: Yimi Argueta YOB: 1960 Date of Visit: 11/6/2020 Peewee Rincon MD 
[address] [place] [address] [phone] VIA Facsimile: [phone] Dear Peewee Rincon MD, Thank you for referring Mr. Sandor Troy to [address]  for evaluation. My notes for this consultation are attached. If you have questions, please do not hesitate to call me. I look forward to following your patient along with you.  
 
 
Sincerely, 
 
Andry Griggs MD

## 2020-12-21 ENCOUNTER — TRANSCRIBE ORDER (OUTPATIENT)
Dept: SCHEDULING | Age: 60
End: 2020-12-21

## 2020-12-21 DIAGNOSIS — I43 NUTRITIONAL AND METABOLIC CARDIOMYOPATHY (HCC): Primary | ICD-10-CM

## 2020-12-21 DIAGNOSIS — R07.9 CHEST PAIN, UNSPECIFIED: ICD-10-CM

## 2020-12-21 DIAGNOSIS — E63.9 NUTRITIONAL AND METABOLIC CARDIOMYOPATHY (HCC): Primary | ICD-10-CM

## 2020-12-21 DIAGNOSIS — E88.9 NUTRITIONAL AND METABOLIC CARDIOMYOPATHY (HCC): Primary | ICD-10-CM

## 2021-01-07 ENCOUNTER — ANCILLARY PROCEDURE (OUTPATIENT)
Dept: CARDIOLOGY CLINIC | Age: 61
End: 2021-01-07
Payer: COMMERCIAL

## 2021-01-07 VITALS
BODY MASS INDEX: 23.38 KG/M2 | WEIGHT: 188 LBS | DIASTOLIC BLOOD PRESSURE: 70 MMHG | HEIGHT: 75 IN | SYSTOLIC BLOOD PRESSURE: 110 MMHG

## 2021-01-07 DIAGNOSIS — I42.1 HOCM (HYPERTROPHIC OBSTRUCTIVE CARDIOMYOPATHY) (HCC): ICD-10-CM

## 2021-01-07 DIAGNOSIS — I48.91 ATRIAL FIBRILLATION, UNSPECIFIED TYPE (HCC): ICD-10-CM

## 2021-01-07 PROCEDURE — 93306 TTE W/DOPPLER COMPLETE: CPT | Performed by: SPECIALIST

## 2021-01-12 ENCOUNTER — DOCUMENTATION ONLY (OUTPATIENT)
Dept: CARDIOLOGY CLINIC | Age: 61
End: 2021-01-12

## 2021-01-12 NOTE — PROGRESS NOTES
Received labs dated 12/24/2020, ordered by Gianluca Carreon NP.     WBC 5.2  Hgb 13.4  Hct 44.6  Plt 261    Glu 117  BUN 10  Cr 0.97  Na 141  K 5  Tbili 0.3  AST 28  ALT 25    Total chol 232  HDL 57      CKMB 2.4

## 2021-01-14 LAB
ECHO AO ASC DIAM: 3.85 CM
ECHO AO ROOT DIAM: 4.03 CM
ECHO AV AREA PEAK VELOCITY: 4.68 CM2
ECHO AV AREA VTI: 3.04 CM2
ECHO AV AREA/BSA PEAK VELOCITY: 2.2 CM2/M2
ECHO AV AREA/BSA VTI: 1.4 CM2/M2
ECHO AV MEAN GRADIENT: 11.93 MMHG
ECHO AV PEAK GRADIENT: 19.49 MMHG
ECHO AV PEAK VELOCITY: 220.75 CM/S
ECHO AV VTI: 57.08 CM
ECHO EST RA PRESSURE: 3 MMHG
ECHO IVC PROX: 1.37 CM
ECHO LA AREA 4C: 24.47 CM2
ECHO LA MAJOR AXIS: 4.04 CM
ECHO LA MINOR AXIS: 1.89 CM
ECHO LA VOL 2C: 69.89 ML (ref 18–58)
ECHO LA VOL 4C: 81.96 ML (ref 18–58)
ECHO LA VOL BP: 85.37 ML (ref 18–58)
ECHO LA VOL/BSA BIPLANE: 39.94 ML/M2 (ref 16–28)
ECHO LA VOLUME INDEX A2C: 32.7 ML/M2 (ref 16–28)
ECHO LA VOLUME INDEX A4C: 38.35 ML/M2 (ref 16–28)
ECHO LV E' LATERAL VELOCITY: 6.11 CM/S
ECHO LV E' SEPTAL VELOCITY: 5.28 CM/S
ECHO LV EDV A2C: 165.5 ML
ECHO LV EDV A4C: 189.14 ML
ECHO LV EDV BP: 174.44 ML (ref 67–155)
ECHO LV EDV INDEX A4C: 88.5 ML/M2
ECHO LV EDV INDEX BP: 81.6 ML/M2
ECHO LV EDV NDEX A2C: 77.4 ML/M2
ECHO LV EJECTION FRACTION A2C: 64 PERCENT
ECHO LV EJECTION FRACTION A4C: 62 PERCENT
ECHO LV EJECTION FRACTION BIPLANE: 63 PERCENT (ref 55–100)
ECHO LV ESV A2C: 60.24 ML
ECHO LV ESV A4C: 71.01 ML
ECHO LV ESV BP: 64.5 ML (ref 22–58)
ECHO LV ESV INDEX A2C: 28.2 ML/M2
ECHO LV ESV INDEX A4C: 33.2 ML/M2
ECHO LV ESV INDEX BP: 30.2 ML/M2
ECHO LV INTERNAL DIMENSION DIASTOLIC: 5.32 CM (ref 4.2–5.9)
ECHO LV INTERNAL DIMENSION SYSTOLIC: 2.86 CM
ECHO LV IVSD: 1.19 CM (ref 0.6–1)
ECHO LV MASS 2D: 256.6 G (ref 88–224)
ECHO LV MASS INDEX 2D: 120.1 G/M2 (ref 49–115)
ECHO LV POSTERIOR WALL DIASTOLIC: 1.2 CM (ref 0.6–1)
ECHO LVOT DIAM: 2.73 CM
ECHO LVOT PEAK GRADIENT: 12.45 MMHG
ECHO LVOT PEAK VELOCITY: 176.39 CM/S
ECHO LVOT SV: 173.3 ML
ECHO LVOT VTI: 29.6 CM
ECHO MV A VELOCITY: 156.2 CM/S
ECHO MV AREA PHT: 4.47 CM2
ECHO MV E DECELERATION TIME (DT): 169.76 MS
ECHO MV E VELOCITY: 88.31 CM/S
ECHO MV E/A RATIO: 0.57
ECHO MV E/E' LATERAL: 14.45
ECHO MV E/E' RATIO (AVERAGED): 15.59
ECHO MV E/E' SEPTAL: 16.73
ECHO MV PRESSURE HALF TIME (PHT): 49.23 MS
ECHO RA MAJOR AXIS: 4.03 CM
ECHO RIGHT VENTRICULAR SYSTOLIC PRESSURE (RVSP): 29.48 MMHG
ECHO RV INTERNAL DIMENSION: 4.17 CM
ECHO RV TAPSE: 1.92 CM (ref 1.5–2)
ECHO TV REGURGITANT MAX VELOCITY: 257.19 CM/S
ECHO TV REGURGITANT PEAK GRADIENT: 26.48 MMHG
LA VOL DISK BP: 79 ML (ref 18–58)
LVOT MG: 7.77 MMHG

## 2021-01-15 NOTE — PROGRESS NOTES
Called and spoke to patient -ID [id number]   Called  patient with results of echocardiogram.  He is feeling well currently. In December he felt pain in the chest when he laid down and better when he sat up. It felt like a broken rib. He went online and thought it was pericarditis and went saw his GP. The patient's symptoms resolved after 2 to days of severe pain in 10 days of mild pain with use of ibuprofen. He had  no recurrence since then. He had lab work with his GP that was normal.    Echo is discussed with patient it shows significant but unchanged thickening with HOCM and there is a gradient  slightly more than before. He remains otherwise asymptomatic and able to exert himself without difficulty and having no resting symptoms. He will continue follow-up as scheduled. Advised him to let me know if he has any new symptoms including perceived pericarditis.   Future Appointments  11/5/2021  9:00 AM    Grant Michael MD CAVREY BS AMB

## 2021-05-11 ENCOUNTER — TELEPHONE (OUTPATIENT)
Dept: CARDIOLOGY CLINIC | Age: 61
End: 2021-05-11

## 2021-05-11 DIAGNOSIS — I42.1 HYPERTROPHIC OBSTRUCTIVE CARDIOMYOPATHY (HOCM) (HCC): ICD-10-CM

## 2021-05-11 NOTE — TELEPHONE ENCOUNTER
Pharmacy [facility] advise patient's insurance does not cover this rx,nebivoloL (Bystolic) 5 mg tablet  requesting an alternative.     [facility]  [phone]

## 2021-05-12 NOTE — TELEPHONE ENCOUNTER
Patient called back and provided his Prescription card information. Patient is still with [facility] and the bin is [id number] and the group number is [id number]. Id# [id number].  Thanks

## 2021-05-27 RX ORDER — NEBIVOLOL 5 MG/1
5 TABLET ORAL DAILY
Qty: 90 TABLET | Refills: 4 | Status: SHIPPED | OUTPATIENT
Start: 2021-05-27 | End: 2022-08-02

## 2021-05-27 NOTE — TELEPHONE ENCOUNTER
Verified patient with two types of identifiers. Called [facility] to check on my PA - they had no record of it. Completed another and received approval. Key: [id number] - PA Case ID: [id number]. Attempted to reach patient by telephone. A message was left stating the above.

## 2021-12-13 ENCOUNTER — OFFICE VISIT (OUTPATIENT)
Dept: CARDIOLOGY CLINIC | Age: 61
End: 2021-12-13
Payer: COMMERCIAL

## 2021-12-13 VITALS
BODY MASS INDEX: 23.75 KG/M2 | SYSTOLIC BLOOD PRESSURE: 120 MMHG | HEART RATE: 64 BPM | WEIGHT: 191 LBS | HEIGHT: 75 IN | OXYGEN SATURATION: 98 % | DIASTOLIC BLOOD PRESSURE: 70 MMHG | RESPIRATION RATE: 16 BRPM

## 2021-12-13 DIAGNOSIS — Z95.818 STATUS POST PLACEMENT OF IMPLANTABLE LOOP RECORDER: ICD-10-CM

## 2021-12-13 DIAGNOSIS — Z82.49 FAMILY HISTORY OF CARDIAC ARREST: ICD-10-CM

## 2021-12-13 DIAGNOSIS — I42.1 HYPERTROPHIC OBSTRUCTIVE CARDIOMYOPATHY (HOCM) (HCC): Primary | ICD-10-CM

## 2021-12-13 DIAGNOSIS — I34.0 NON-RHEUMATIC MITRAL REGURGITATION: ICD-10-CM

## 2021-12-13 DIAGNOSIS — I49.3 SYMPTOMATIC PVCS: ICD-10-CM

## 2021-12-13 DIAGNOSIS — I48.0 PAROXYSMAL ATRIAL FIBRILLATION (HCC): ICD-10-CM

## 2021-12-13 DIAGNOSIS — E78.00 HYPERCHOLESTEREMIA: ICD-10-CM

## 2021-12-13 DIAGNOSIS — I34.89 SYSTOLIC ANTERIOR MOVEMENT OF MITRAL VALVE: ICD-10-CM

## 2021-12-13 PROCEDURE — 99214 OFFICE O/P EST MOD 30 MIN: CPT | Performed by: SPECIALIST

## 2021-12-13 PROCEDURE — 93000 ELECTROCARDIOGRAM COMPLETE: CPT | Performed by: SPECIALIST

## 2021-12-13 NOTE — PATIENT INSTRUCTIONS
You have been scheduled for an echocardiogram. We will send results via [employer] and see you back for an annual office visit with another echocardiogram prior.

## 2021-12-13 NOTE — PROGRESS NOTES
Wendy Viera     1960       Grant Layton MD, [facility]  Date of Visit-12/13/2021   PCP is Davion Bocanegra MD   [facility] [place]  [facility]  HPI:  Wendy Viera is a 62 y.o. male one year fu  · Has a hx of IHSS with PVCs. ·  The MRI confirmed IHSS with no scarring, wall thickness inferior 19 mm basal anterior septum 16 mm. There was an elongated MV leaflet causing mild JUNIE, based on this we recommended that he engage in non competitive athletics. · His daughter has had sudden death with revival withARVCD. His family has been tested and no other members of the family have [address] or ARVCD.   · Has IRL with brief AFib ILR removed on 9/25/20. · Had few PVCs, they have resolved as he has limited salt and caffeine  · Stress test 7/18/17 he walked for 11 minutes to a peak HR of 152 which is 92%. Today. .. Pt had labs in December 2020 which showed XOL of 232, , and HDL 57. Echo at last visit showed significant but unchanged thickening of HOCM. Pt states that he \"self-diagnosed\" himself with pericarditis in January, and reports that he had trouble sleeping. He states that he felt a \"positional\" CP afterwards, noting some minimal pressure and pain when taking deep breaths, exercising, or doing heavy exertion. He reports that the sx's lasted for about 5-6 months, and then diminished significantly after having only 2 episodes in the past year. Denies edema, syncope or shortness of breath at rest, has no tachycardia, palpitations or sense of arrhythmia. EKG: Sinus  Rhythm  left ventricular hypertrophy on non-voltage basis. -Nonspecific ST depression  -Seen with left ventricular hypertrophy (strain)     Assessment/Plan:     1. Hypertrophic obstructive cardiomyopathy (HOCM) (HCC)  No limitation with ADL's, murmur unchanged, no CHF. MRI 2017, with walls measuring 19 and 16 mm and no outflow obstruction.  JUNIE was present causing slight LVOT and there was no unusual LGE enhancement. With no scar, Dr. Yaakov Dalal felt defibrillator was not needed. An IRL that was In for two years showed no ventricular arrhythmia. Will check echo yearly. - AMB POC EKG ROUTINE W/ 12 LEADS, INTER & REP    2. Paroxysmal atrial fibrillation (HCC)  No recurrence, no OAC, low risk, infrequent. 3. Non-rheumatic mitral regurgitation  Has systolic anterior motion. Needs repeat echo for gradient. Addendum: 12/29/2021 2:53 PM   12/23/21    ECHO ADULT COMPLETE 12/25/2021 12/25/2021    Interpretation Summary    Left Ventricle: Left ventricle size is normal. Severe basal septal thickening. Normal wall motion. Normal left ventricular systolic function. EF by 2D Simpsons Biplane is 65%. Grade I diastolic dysfunction with normal LAP.   Mitral Valve: Findings consistent with myxomatous degeneration. Moderate systolic anterior motion of the chordae. Mild to moderate transvalvular regurgitation.   Tricuspid Valve: Normal RVSP. RVSP is 31 mmHg.   Left Atrium: Left atrial volume index is mildly increased (35-41 mL/m2).   Right Atrium: Right atrium is mildly dilated. Signed by: Lizzette Franklin MD on 12/25/2021 12:21 AM                  4. Systolic anterior movement of mitral valve  As above    5. Hypercholesteremia  LDL was 152 a year ago, he is reaching the point when we should consider statin. Will get last numbers and call with options. This is likely familial. Has a high HDL. 6. Symptomatic PVCs  He notes some CP that was positional, no associated with palps, happened twice in the past year. He thought it was pericarditis, and resolved spontaneously. Echo will be done. 7. Family history of cardiac arrest  Daughter had VT arrest.     8. Status post placement of implantable loop recorder  Now removed. F/u in 1 year with echo one week prior     Impression:   1.  Hypertrophic obstructive cardiomyopathy (HOCM) (HCC)    2. Paroxysmal atrial fibrillation ([address] 3. Non-rheumatic mitral regurgitation    4. Systolic anterior movement of mitral valve    5. Hypercholesteremia    6. Symptomatic PVCs    7. Family history of cardiac arrest    8. Status post placement of implantable loop recorder       Cardiac History:   MRI 2017=  1. Hypertrophic cardiomyopathy with severe hypertrophy of the mid inferoseptal  wall [id number] mm and moderate hypertrophy of the basal anteroseptal wall  measuring 16 mm. Hyperdynamic left ventricular systolic function. LVEF 75%. 2. There is no resting left ventricular outflow tract obstruction with systolic  anterior motion of the anterior mitral valve leaflet and anterior chordae. 3. Normal right ventricular size and systolic function. RVEF 60%. 4. Elongated anterior mitral valve leaflet otherwise normal MV structure. Systolic anterior motion of the anterior mitral valve leaflet and anterior  chordae causing significant resting left ventricular outflow tract obstruction. Mild to moderate 1-2+ mitral regurgitation. 5. On LGE study, there is no myocardial enhancement. There is no myocardial  scar. There is no features of recent or old myocardial infarction. There is no  features of infiltrative sarcoidosis or amyloidosis. All myocardial walls are  completely viable. His is typical expression of HCM with two discrete areas of hypertrophy along with resting LVOT obstruction. Echo 2-23-16=dynamic obstruction during Valslava in the  outflow tract with a peak velocity of 3.8 cm/s and a peak gradient of 60  mmHg. There was dynamic obstruction at rest in the outflow tract with a  peak velocity 3.3 m/s and a peak gradient of 45 mmHg. Systolic function  was normal. Ejection fraction 55 % to 65 %. Wall thickness was  mildly to moderately increased  Mitral valve: mild regurgitation. Holter Monitor, November 2012: PVCs , short run of bigeminy but no VT.    ECHO 11/21/12 LVEF of 75 % severe asymmetric hypertrophy of the septum.  dynamic obstruction during Valsalva in the outflow tract, peak velocity of 3.6 cm/s and a peak gradient of 50 mmHg. IVS= 17 mm Pw= 9 mm.   mild mitral valve regurgitation systolic anterior motion (JUNIE)of the chordal structures. JENNIFER gno evidence of ischemia with the patient walking 9 minutes on the David Protocol and achieving 94% of MPHR. MRI which was terminated early on 13 due to a claustrophobic reaction demonstrated an LVEF of 76% with mild septal hypertrophy. There was a very steep aorto-septal angle of 107 degrees due to the sigmoid septum and the steep angulation of the aortic root creating an unfavorable mitral valve apparatus apposition with the septum leading to significant JUNIE of the chordae and left ventricular outflow tract obstruction. Social History: Nonsmoker. . Works as an , has five children. He is  and does a lot of activities with his children. Family History: maternal uncle with a history of CABG and MI who  at age 46 and a maternal grandfather with known coronary disease and \"heart attacks\" who  at age 50. Daughter had sudden collapse, cardiac arrest with exercising, seen [place] [facility]. Future Appointments   Date Time Provider Johnathon Saxena   2021  9:00 AM ECHO, JAS PASCUAL [facility]   2022  9:00 AM VASCULAR, JAS PASCUAL [facility]   2022  9:00 AM Deion Anderson MD [facility]        Patient Care Team:  Rock Padilla MD as PCP - General (Family Medicine)  Deion Anderson MD (Cardiology)  Jean Claude Alejo MD (Cardiology)    ROS-except as noted above. . A complete cardiac and respiratory are reviewed and negative except as above ; Resp-denies wheezing  or productive cough,.  Const- No unusual weight loss or fever; Neuro-no recent seizure or CVA ; GI- No BRBPR, abdom pain, bloating ; - no  hematuria   see supplement sheet, initialed and to be scanned by staff  Past Medical History:   Diagnosis Date    Asymmetric septal hypertrophy ([facility]     MRI March 2013 and echo    Family history of cardiac arrest     luiser on treadmill, followed by EP at [facility] [address] [place] Hypertrophic cardiomyopathy ([facility]     dynamic obstruction during Valsalva in the outflow tract, peak velocity of 3.6 cm/s and a peak gradient of 50 mmHg    Mitral regurgitation     PAF (paroxysmal atrial fibrillation) ([facility] 09/3/1763    Systolic anterior movement of mitral valve     JUNIE      Social Hx= reports that he has never smoked. He has never used smokeless tobacco. He reports current alcohol use. He reports that he does not use drugs. Exam and Labs:  /70   Pulse 64   Resp 16   Ht 6' 3\" (1.905 m)   Wt 191 lb (86.6 kg)   SpO2 98%   BMI 23.87 kg/m² Constitutional:  NAD, comfortable  Head: NC,AT. Eyes: No scleral icterus. Neck:  Neck supple. No JVD present. Throat: moist mucous membranes. Chest: Effort normal & normal respiratory excursion . Neurological: alert, conversant and oriented . Skin: Skin is not cold. No obvious systemic rash noted. Not diaphoretic. No erythema. Psychiatric:  Grossly normal mood and affect. Behavior appears normal. Extremities:  no clubbing or cyanosis. Abdomen: non distended    Lungs:breath sounds normal. No stridor. distress, wheezes or  Rales. Heart:2/6 medium pitched, medium length murmur, heard throughout, loudest at apex normal rate, regular rhythm, normal S1, S2, no rubs, clicks or gallops , PMI non displaced. Edema: Edema is trace sockline.   No results found for: CHOL, CHOLX, CHLST, CHOLV, HDL, HDLP, LDL, LDLC, DLDLP, TGLX, TRIGL, TRIGP, CHHD, CHHDX  Lab Results   Component Value Date/Time    Sodium 141 09/24/2020 04:32 PM    Potassium 4.2 09/24/2020 04:32 PM    Chloride 100 09/24/2020 04:32 PM    CO2 26 09/24/2020 04:32 PM    Glucose 96 09/24/2020 04:32 PM    BUN 14 09/24/2020 04:32 PM    Creatinine 1.06 09/24/2020 04:32 PM    BUN/Creatinine ratio 13 09/24/2020 04:32 PM    GFR est AA 89 09/24/2020 04:32 PM    GFR est non-AA 77 09/24/2020 04:32 PM    Calcium 9.9 09/24/2020 04:32 PM      Wt Readings from Last 3 Encounters:   12/13/21 191 lb (86.6 kg)   01/07/21 188 lb (85.3 kg)   11/06/20 188 lb 12.8 oz (85.6 kg)      BP Readings from Last 3 Encounters:   12/13/21 120/70   01/07/21 110/70   11/06/20 110/70      Current Outpatient Medications   Medication Sig    nebivoloL (Bystolic) 5 mg tablet Take 1 Tablet by mouth daily.  latanoprost (XALATAN) 0.005 % ophthalmic solution Administer 1 Drop to both eyes nightly.  cetirizine (ZYRTEC) 10 mg tablet Take 10 mg by mouth daily.  multivitamin (ONE A DAY) tablet Take 1 Tab by mouth daily. No current facility-administered medications for this visit. Impression see above.       Written by Shakira Garcia, as dictated by Lalitha Souza MD.

## 2021-12-13 NOTE — Clinical Note
12/13/2021    Patient: Melina Vargas   YOB: 1960   Date of Visit: 12/13/2021     Kiarra Moreau MD  [address] [place] [address] [phone]  Via Fax: [phone]    Dear Kiarra Moreau MD,      Thank you for referring Mr. Dilma Kramer to [address]  for evaluation. My notes for this consultation are attached. If you have questions, please do not hesitate to call me. I look forward to following your patient along with you.       Sincerely,    William Aparicio MD

## 2021-12-23 ENCOUNTER — ANCILLARY PROCEDURE (OUTPATIENT)
Dept: CARDIOLOGY CLINIC | Age: 61
End: 2021-12-23
Payer: COMMERCIAL

## 2021-12-23 VITALS
BODY MASS INDEX: 23.75 KG/M2 | WEIGHT: 191 LBS | DIASTOLIC BLOOD PRESSURE: 70 MMHG | SYSTOLIC BLOOD PRESSURE: 120 MMHG | HEIGHT: 75 IN

## 2021-12-23 DIAGNOSIS — I34.0 NON-RHEUMATIC MITRAL REGURGITATION: ICD-10-CM

## 2021-12-23 DIAGNOSIS — I48.0 PAROXYSMAL ATRIAL FIBRILLATION (HCC): ICD-10-CM

## 2021-12-23 DIAGNOSIS — I34.89 SYSTOLIC ANTERIOR MOVEMENT OF MITRAL VALVE: ICD-10-CM

## 2021-12-23 DIAGNOSIS — Z82.49 FAMILY HISTORY OF CARDIAC ARREST: ICD-10-CM

## 2021-12-23 DIAGNOSIS — I42.1 HYPERTROPHIC OBSTRUCTIVE CARDIOMYOPATHY (HOCM) (HCC): ICD-10-CM

## 2021-12-23 DIAGNOSIS — I49.3 SYMPTOMATIC PVCS: ICD-10-CM

## 2021-12-23 PROCEDURE — 93306 TTE W/DOPPLER COMPLETE: CPT | Performed by: SPECIALIST

## 2021-12-25 LAB
ECHO AO ROOT DIAM: 3.9 CM
ECHO AO ROOT INDEX: 1.81 CM/M2
ECHO AV AREA PEAK VELOCITY: 4.1 CM2
ECHO AV AREA PEAK VELOCITY: 4.1 CM2
ECHO AV AREA VTI: 3.8 CM2
ECHO AV AREA VTI: 3.8 CM2
ECHO AV MEAN GRADIENT: 8 MMHG
ECHO AV MEAN VELOCITY: 1.4 M/S
ECHO AV PEAK GRADIENT: 13 MMHG
ECHO AV PEAK VELOCITY: 1.8 M/S
ECHO AV VELOCITY RATIO: 0.83
ECHO AV VTI: 40.8 CM
ECHO EST RA PRESSURE: 3 MMHG
ECHO LA DIAMETER INDEX: 1.91 CM/M2
ECHO LA DIAMETER: 4.1 CM
ECHO LA TO AORTIC ROOT RATIO: 1.05
ECHO LA VOL 2C: 73 ML (ref 18–58)
ECHO LA VOL 4C: 83 ML (ref 18–58)
ECHO LA VOL BP: 82 ML (ref 18–58)
ECHO LA VOL/BSA BIPLANE: 38 ML/M2 (ref 16–34)
ECHO LA VOLUME AREA LENGTH: 88 ML
ECHO LA VOLUME INDEX A2C: 34 ML/M2 (ref 16–34)
ECHO LA VOLUME INDEX A4C: 39 ML/M2 (ref 16–34)
ECHO LA VOLUME INDEX AREA LENGTH: 41 ML/M2 (ref 16–34)
ECHO LV E' LATERAL VELOCITY: 8 CM/S
ECHO LV E' SEPTAL VELOCITY: 7 CM/S
ECHO LV EDV A2C: 130 ML
ECHO LV EDV A4C: 161 ML
ECHO LV EDV BP: 148 ML (ref 67–155)
ECHO LV EDV INDEX A4C: 75 ML/M2
ECHO LV EDV INDEX BP: 69 ML/M2
ECHO LV EDV NDEX A2C: 60 ML/M2
ECHO LV EJECTION FRACTION A2C: 69 %
ECHO LV EJECTION FRACTION A4C: 60 %
ECHO LV EJECTION FRACTION BIPLANE: 65 % (ref 55–100)
ECHO LV ESV A2C: 41 ML
ECHO LV ESV A4C: 64 ML
ECHO LV ESV BP: 52 ML (ref 22–58)
ECHO LV ESV INDEX A2C: 19 ML/M2
ECHO LV ESV INDEX A4C: 30 ML/M2
ECHO LV ESV INDEX BP: 24 ML/M2
ECHO LV FRACTIONAL SHORTENING: 46 % (ref 28–44)
ECHO LV INTERNAL DIMENSION DIASTOLE INDEX: 2.33 CM/M2
ECHO LV INTERNAL DIMENSION DIASTOLIC: 5 CM (ref 4.2–5.9)
ECHO LV INTERNAL DIMENSION SYSTOLIC INDEX: 1.26 CM/M2
ECHO LV INTERNAL DIMENSION SYSTOLIC: 2.7 CM
ECHO LV IVSD: 1.1 CM (ref 0.6–1)
ECHO LV MASS 2D: 220.3 G (ref 88–224)
ECHO LV MASS INDEX 2D: 102.4 G/M2 (ref 49–115)
ECHO LV POSTERIOR WALL DIASTOLIC: 1.2 CM (ref 0.6–1)
ECHO LV RELATIVE WALL THICKNESS RATIO: 0.48
ECHO LVOT AREA: 4.9 CM2
ECHO LVOT AV VTI INDEX: 0.76
ECHO LVOT DIAM: 2.5 CM
ECHO LVOT MEAN GRADIENT: 5 MMHG
ECHO LVOT PEAK GRADIENT: 9 MMHG
ECHO LVOT PEAK VELOCITY: 1.5 M/S
ECHO LVOT STROKE VOLUME INDEX: 71.2 ML/M2
ECHO LVOT SV: 153.1 ML
ECHO LVOT VTI: 31.2 CM
ECHO MV A VELOCITY: 0.99 M/S
ECHO MV AREA PHT: 3.4 CM2
ECHO MV E DECELERATION TIME (DT): 220.3 MS
ECHO MV E VELOCITY: 0.73 M/S
ECHO MV E/A RATIO: 0.74
ECHO MV E/E' LATERAL: 9.13
ECHO MV E/E' RATIO (AVERAGED): 9.78
ECHO MV E/E' SEPTAL: 10.43
ECHO MV PRESSURE HALF TIME (PHT): 63.9 MS
ECHO RA MAJOR AXIS INDEX: 2.09 CM/M2
ECHO RA MAJOR AXIS: 4.5 CM
ECHO RIGHT VENTRICULAR SYSTOLIC PRESSURE (RVSP): 31 MMHG
ECHO RV FREE WALL PEAK S': 15 CM/S
ECHO RV INTERNAL DIMENSION: 3.9 CM
ECHO RV TAPSE: 2.1 CM (ref 1.5–2)
ECHO TV REGURGITANT MAX VELOCITY: 2.63 M/S
ECHO TV REGURGITANT PEAK GRADIENT: 28 MMHG

## 2021-12-25 NOTE — PROGRESS NOTES
Echo is stable and unchanged with good pump function and mild mitral valve regurgitation   No changes in plans or meds

## 2022-03-19 PROBLEM — Z95.818 STATUS POST PLACEMENT OF IMPLANTABLE LOOP RECORDER: Status: ACTIVE | Noted: 2017-03-22

## 2022-03-19 PROBLEM — I49.3 SYMPTOMATIC PVCS: Status: ACTIVE | Noted: 2017-03-22

## 2022-03-20 PROBLEM — I48.0 PAF (PAROXYSMAL ATRIAL FIBRILLATION) (HCC): Status: ACTIVE | Noted: 2017-11-05

## 2022-08-02 DIAGNOSIS — I42.1 HYPERTROPHIC OBSTRUCTIVE CARDIOMYOPATHY (HOCM) (HCC): ICD-10-CM

## 2022-08-02 RX ORDER — NEBIVOLOL 5 MG/1
TABLET ORAL
Qty: 90 TABLET | Refills: 3 | Status: SHIPPED | OUTPATIENT
Start: 2022-08-02

## 2022-08-02 NOTE — TELEPHONE ENCOUNTER
Per VO by MD.    Future Appointments   Date Time Provider Johnathon Odessa   12/6/2022  9:00 AM VASCULAR, JAS PASCUAL BS AMB   12/13/2022  9:00 AM Grant Michael MD CAVREY BS AMB

## 2022-12-06 ENCOUNTER — ANCILLARY PROCEDURE (OUTPATIENT)
Dept: CARDIOLOGY CLINIC | Age: 62
End: 2022-12-06
Payer: COMMERCIAL

## 2022-12-06 VITALS — HEIGHT: 75 IN | BODY MASS INDEX: 23.75 KG/M2 | WEIGHT: 191 LBS

## 2022-12-06 DIAGNOSIS — I34.0 NON-RHEUMATIC MITRAL REGURGITATION: ICD-10-CM

## 2022-12-06 DIAGNOSIS — I48.0 PAROXYSMAL ATRIAL FIBRILLATION (HCC): ICD-10-CM

## 2022-12-06 DIAGNOSIS — I42.1 HYPERTROPHIC OBSTRUCTIVE CARDIOMYOPATHY (HOCM) (HCC): ICD-10-CM

## 2022-12-06 DIAGNOSIS — I49.3 SYMPTOMATIC PVCS: ICD-10-CM

## 2022-12-06 DIAGNOSIS — I34.89 SYSTOLIC ANTERIOR MOVEMENT OF MITRAL VALVE: ICD-10-CM

## 2022-12-06 DIAGNOSIS — Z82.49 FAMILY HISTORY OF CARDIAC ARREST: ICD-10-CM

## 2022-12-06 PROCEDURE — 93306 TTE W/DOPPLER COMPLETE: CPT | Performed by: SPECIALIST

## 2022-12-07 LAB
ECHO AO ASC DIAM: 4.1 CM
ECHO AO ASCENDING AORTA INDEX: 1.91 CM/M2
ECHO AO ROOT DIAM: 3.5 CM
ECHO AO ROOT INDEX: 1.63 CM/M2
ECHO AV AREA PEAK VELOCITY: 3.3 CM2
ECHO AV AREA VTI: 4 CM2
ECHO AV AREA/BSA PEAK VELOCITY: 1.5 CM2/M2
ECHO AV AREA/BSA VTI: 1.9 CM2/M2
ECHO AV MEAN GRADIENT: 15 MMHG
ECHO AV MEAN VELOCITY: 1.8 M/S
ECHO AV PEAK GRADIENT: 28 MMHG
ECHO AV PEAK VELOCITY: 2.6 M/S
ECHO AV VELOCITY RATIO: 0.73
ECHO AV VTI: 50.8 CM
ECHO EST RA PRESSURE: 3 MMHG
ECHO IVC PROX: 1.9 CM
ECHO LA DIAMETER INDEX: 2.05 CM/M2
ECHO LA DIAMETER: 4.4 CM
ECHO LA TO AORTIC ROOT RATIO: 1.26
ECHO LA VOL 2C: 63 ML (ref 18–58)
ECHO LA VOL 4C: 67 ML (ref 18–58)
ECHO LA VOL BP: 68 ML (ref 18–58)
ECHO LA VOL/BSA BIPLANE: 32 ML/M2 (ref 16–34)
ECHO LA VOLUME AREA LENGTH: 71 ML
ECHO LA VOLUME INDEX A2C: 29 ML/M2 (ref 16–34)
ECHO LA VOLUME INDEX A4C: 31 ML/M2 (ref 16–34)
ECHO LA VOLUME INDEX AREA LENGTH: 33 ML/M2 (ref 16–34)
ECHO LV E' LATERAL VELOCITY: 6 CM/S
ECHO LV E' SEPTAL VELOCITY: 7 CM/S
ECHO LV EDV A2C: 161 ML
ECHO LV EDV A4C: 164 ML
ECHO LV EDV BP: 163 ML (ref 67–155)
ECHO LV EDV INDEX A4C: 76 ML/M2
ECHO LV EDV INDEX BP: 76 ML/M2
ECHO LV EDV NDEX A2C: 75 ML/M2
ECHO LV EJECTION FRACTION A2C: 59 %
ECHO LV EJECTION FRACTION A4C: 59 %
ECHO LV EJECTION FRACTION BIPLANE: 59 % (ref 55–100)
ECHO LV ESV A2C: 67 ML
ECHO LV ESV A4C: 67 ML
ECHO LV ESV BP: 67 ML (ref 22–58)
ECHO LV ESV INDEX A2C: 31 ML/M2
ECHO LV ESV INDEX A4C: 31 ML/M2
ECHO LV ESV INDEX BP: 31 ML/M2
ECHO LV FRACTIONAL SHORTENING: 44 % (ref 28–44)
ECHO LV INTERNAL DIMENSION DIASTOLE INDEX: 1.81 CM/M2
ECHO LV INTERNAL DIMENSION DIASTOLIC: 3.9 CM (ref 4.2–5.9)
ECHO LV INTERNAL DIMENSION SYSTOLIC INDEX: 1.02 CM/M2
ECHO LV INTERNAL DIMENSION SYSTOLIC: 2.2 CM
ECHO LV IVSD: 1.9 CM (ref 0.6–1)
ECHO LV MASS 2D: 224.6 G (ref 88–224)
ECHO LV MASS INDEX 2D: 104.4 G/M2 (ref 49–115)
ECHO LV POSTERIOR WALL DIASTOLIC: 1.1 CM (ref 0.6–1)
ECHO LV RELATIVE WALL THICKNESS RATIO: 0.56
ECHO LVOT AREA: 4.5 CM2
ECHO LVOT AV VTI INDEX: 0.89
ECHO LVOT DIAM: 2.4 CM
ECHO LVOT MEAN GRADIENT: 8 MMHG
ECHO LVOT PEAK GRADIENT: 15 MMHG
ECHO LVOT PEAK VELOCITY: 1.9 M/S
ECHO LVOT STROKE VOLUME INDEX: 95.3 ML/M2
ECHO LVOT SV: 204.8 ML
ECHO LVOT VTI: 45.3 CM
ECHO MV A VELOCITY: 1 M/S
ECHO MV AREA PHT: 3.1 CM2
ECHO MV E DECELERATION TIME (DT): 243.7 MS
ECHO MV E VELOCITY: 0.74 M/S
ECHO MV E/A RATIO: 0.74
ECHO MV E/E' LATERAL: 12.33
ECHO MV E/E' RATIO (AVERAGED): 11.45
ECHO MV E/E' SEPTAL: 10.57
ECHO MV PRESSURE HALF TIME (PHT): 70.7 MS
ECHO RIGHT VENTRICULAR SYSTOLIC PRESSURE (RVSP): 22 MMHG
ECHO RV INTERNAL DIMENSION: 3.8 CM
ECHO RV TAPSE: 2.1 CM (ref 1.7–?)
ECHO TV REGURGITANT MAX VELOCITY: 2.18 M/S
ECHO TV REGURGITANT PEAK GRADIENT: 19 MMHG

## 2022-12-13 ENCOUNTER — TELEPHONE (OUTPATIENT)
Dept: CARDIOLOGY CLINIC | Age: 62
End: 2022-12-13

## 2022-12-13 ENCOUNTER — OFFICE VISIT (OUTPATIENT)
Dept: CARDIOLOGY CLINIC | Age: 62
End: 2022-12-13
Payer: COMMERCIAL

## 2022-12-13 VITALS
RESPIRATION RATE: 16 BRPM | HEART RATE: 70 BPM | OXYGEN SATURATION: 97 % | BODY MASS INDEX: 22.88 KG/M2 | WEIGHT: 184 LBS | SYSTOLIC BLOOD PRESSURE: 110 MMHG | HEIGHT: 75 IN | DIASTOLIC BLOOD PRESSURE: 78 MMHG

## 2022-12-13 DIAGNOSIS — I49.3 SYMPTOMATIC PVCS: ICD-10-CM

## 2022-12-13 DIAGNOSIS — I48.0 PAROXYSMAL ATRIAL FIBRILLATION (HCC): ICD-10-CM

## 2022-12-13 DIAGNOSIS — I34.89 SYSTOLIC ANTERIOR MOVEMENT OF MITRAL VALVE: ICD-10-CM

## 2022-12-13 DIAGNOSIS — I42.1 HYPERTROPHIC OBSTRUCTIVE CARDIOMYOPATHY (HOCM) (HCC): Primary | ICD-10-CM

## 2022-12-13 DIAGNOSIS — Z82.49 FAMILY HISTORY OF CARDIAC ARREST: ICD-10-CM

## 2022-12-13 DIAGNOSIS — I44.7 NEW ONSET LEFT BUNDLE BRANCH BLOCK (LBBB): ICD-10-CM

## 2022-12-13 DIAGNOSIS — I34.0 NON-RHEUMATIC MITRAL REGURGITATION: ICD-10-CM

## 2022-12-13 DIAGNOSIS — Z95.818 STATUS POST PLACEMENT OF IMPLANTABLE LOOP RECORDER: ICD-10-CM

## 2022-12-13 DIAGNOSIS — E78.00 HYPERCHOLESTEREMIA: ICD-10-CM

## 2022-12-13 PROCEDURE — 93000 ELECTROCARDIOGRAM COMPLETE: CPT | Performed by: SPECIALIST

## 2022-12-13 PROCEDURE — 99214 OFFICE O/P EST MOD 30 MIN: CPT | Performed by: SPECIALIST

## 2022-12-13 NOTE — PROGRESS NOTES
Kalina Domínguez     1960       Grant Tucker MD, [facility]  Date of Visit-12/13/2022   PCP is Tiana Chin MD   [facility]  [facility]  HPI:  Kalina Domínguez is a 62 y.o. male   one year fu  IHSS with PVCs. The MRI confirmed IHSS with no scarring, wall thickness inferior 19 mm basal anterior septum 16 mm. There was an elongated MV leaflet causing mild JUNIE, based on this we recommended that he engage in non competitive athletics. His daughter has had sudden death with revival with ARVCD. His family has been tested and no other members of the family have [address] or [address] Has IRL with brief AFib ILR removed on 9/25/20. Had few PVCs, they have resolved as he has limited salt and caffeine  Stress test 7/18/17 he walked for 11 minutes to a peak HR of 152 which is 92%. D  XOL- December 2020 which showed XOL of 232, , and HDL 57  ? Pericarditis (self dx ) Jan 2021 -\"positional\" CP afterwards, noting some minimal pressure and pain when taking deep breaths, exercising, or doing heavy exertion. He reports that the sx's lasted for about 5-6 months  Echo 12/6/22      Today. .. Recent echo with EF 59% , basal septal 19 mm , mild LVOT at rest and large change with Valsalva peak form 28 to 115 mm Hg, mild mod MR, mod JUNIE, RVSP low  EKG shows change with LBBB and NSR  He feels very well, active exercising as per usual  Denies chest pain, edema, syncope or shortness of breath at rest   Has no tachycardia , palpitations or sense of arrythmia    Had shingles earlier this year. He goes to the gym and swims at [employer]. EKG-NSR LBBB  Assessment/Plan:     Patient Instructions   Please call [phone] to schedule your cardiac MRI at [facility] or [address] [facility]. You have been scheduled for a stress echocardiogram. Please arrive 15 minutes prior to your appointment. Wear comfortable clothes and shoes.  Please do not eat or drink anything other than water two hours prior to test. Please do not take your bystolic 24 hours prior. A 30 day loop monitor has been ordered for you. This will be mailed to the address given to us in the next 5-7 business days. Please read over the instructions given to you about [employer] loop monitors. If you have any insurance questions regarding coverage and out of pocket cost please contact the number below. If you have any billing questions regarding deductible or responsibility call [phone]. If you need additional supplies or issue with your monitor please call [phone]. We will call with results and see you back in 6 months. 1. Hypertrophic obstructive cardiomyopathy (HOCM) (HCC)  No limitation with ADL's, murmur unchanged, no CHF. MRI 2017, with walls measuring 19 and 16 mm and no outflow obstruction. JUNIE was present causing slight LVOT and there was no unusual LGE enhancement. With no scar, Dr. Erin Merchant felt defibrillator was not needed. An IRL that was In for two years showed no ventricular arrhythmia. Will check echo yearly. Last echo with stable picture   Echo shows the anticipated gradient and hypertrophy. EKG demonstrates a new left bundle branch block  Check loop, MRI and then fu results  Lets get a treadmill JENNIFER and see if gradient varies with exercise OR arrhythmia   Discuss EP the change in BBB  Continue Bystolic    2. Paroxysmal atrial fibrillation (HCC)  No recurrence, no OAC-pt prefers not, low risk, infrequent. On beta blocker   3. Non-rheumatic mitral regurgitation  Has systolic anterior motion. No change in MR 1-2+MR    4 Hypercholesteremia  LDL was 152 This is likely familial. Has a high HDL. At goal , denies excess muscle aches or new liver issues  Key Antihyperlipidemia Meds       The patient is on no antihyperlipidemia meds. No results found for: LDL, LDLC, DLDLP      5. Systolic anterior movement of mitral valve  As above    6. Symptomatic PVCs  stable  7.  Family history of cardiac arrest  Daughter had VT arrest.   8. Status post placement of implantable loop recorder  Now removed. Impression:   1. Hypertrophic obstructive cardiomyopathy (HOCM) ([address]    2. New onset left bundle branch block (LBBB)    3. Paroxysmal atrial fibrillation ([address]    4. Non-rheumatic mitral regurgitation    5. Hypercholesteremia    6. Systolic anterior movement of mitral valve    7. Symptomatic PVCs    8. Family history of cardiac arrest    9. Status post placement of implantable loop recorder       Cardiac History:   MRI 2017=  1. Hypertrophic cardiomyopathy with severe hypertrophy of the mid inferoseptal  wall [id number] mm and moderate hypertrophy of the basal anteroseptal wall  measuring 16 mm. Hyperdynamic left ventricular systolic function. LVEF 75%. 2. There is no resting left ventricular outflow tract obstruction with systolic  anterior motion of the anterior mitral valve leaflet and anterior chordae. 3. Normal right ventricular size and systolic function. RVEF 60%. 4. Elongated anterior mitral valve leaflet otherwise normal MV structure. Systolic anterior motion of the anterior mitral valve leaflet and anterior  chordae causing significant resting left ventricular outflow tract obstruction. Mild to moderate 1-2+ mitral regurgitation. 5. On LGE study, there is no myocardial enhancement. There is no myocardial  scar. There is no features of recent or old myocardial infarction. There is no  features of infiltrative sarcoidosis or amyloidosis. All myocardial walls are  completely viable. His is typical expression of HCM with two discrete areas of hypertrophy along with resting LVOT obstruction. Echo 2-23-16=dynamic obstruction during Valslava in the  outflow tract with a peak velocity of 3.8 cm/s and a peak gradient of 60  mmHg. There was dynamic obstruction at rest in the outflow tract with a  peak velocity 3.3 m/s and a peak gradient of 45 mmHg.  Systolic function  was normal. Ejection fraction 55 % to 65 %. Wall thickness was  mildly to moderately increased  Mitral valve: mild regurgitation. Holter Monitor, 2012: PVCs , short run of bigeminy but no VT.    ECHO 12 LVEF of 75 % severe asymmetric hypertrophy of the septum. dynamic obstruction during Valsalva in the outflow tract, peak velocity of 3.6 cm/s and a peak gradient of 50 mmHg. IVS= 17 mm Pw= 9 mm.   mild mitral valve regurgitation systolic anterior motion (JUNIE)of the chordal structures. JENNIFER gno evidence of ischemia with the patient walking 9 minutes on the David Protocol and achieving 94% of MPHR. MRI which was terminated early on 13 due to a claustrophobic reaction demonstrated an LVEF of 76% with mild septal hypertrophy. There was a very steep aorto-septal angle of 107 degrees due to the sigmoid septum and the steep angulation of the aortic root creating an unfavorable mitral valve apparatus apposition with the septum leading to significant JUNIE of the chordae and left ventricular outflow tract obstruction. Social History: Nonsmoker. . Works as an , has five children. He is  and does a lot of activities with his children. Family History: maternal uncle with a history of CABG and MI who  at age 46 and a maternal grandfather with known coronary disease and \"heart attacks\" who  at age 50. Daughter had sudden collapse, cardiac arrest with exercising, seen at [facility]. Future Appointments   Date Time Provider Johnathon Odessa   2/3/2023  3:00 PM ECHO, JAS PASCUAL [facility]   2023  9:20 AM MD SAMARA Holly [facility]          Patient Care Team:  Sapna De León MD as PCP - General (Family Medicine)  Theresa Morrow MD (Cardiovascular Disease Physician)  Gilma Mcclendon MD (Cardiovascular Disease Physician)    ROS-except as noted above. . A complete cardiac and respiratory are reviewed and negative except as above ; Resp-denies wheezing  or productive cough,. Const- No unusual weight loss or fever; Neuro-no recent seizure or CVA ; GI- No BRBPR, abdom pain, bloating ; - no  hematuria   see supplement sheet, initialed and to be scanned by staff  Past Medical History:   Diagnosis Date    Asymmetric septal hypertrophy ([facility] [address]     MRI March 2013 and echo    Family history of cardiac arrest     светлана on treadmill, followed by EP at [facility] Dr Dulce Hassan    Hypercholesteremia     Hypertrophic cardiomyopathy ([facility] [address]     dynamic obstruction during Valsalva in the outflow tract, peak velocity of 3.6 cm/s and a peak gradient of 50 mmHg    Mitral regurgitation     PAF (paroxysmal atrial fibrillation) ([facility] [address] 42/2/3940    Systolic anterior movement of mitral valve     JUNIE      Social Hx= reports that he has never smoked. He has never used smokeless tobacco. He reports current alcohol use. He reports that he does not use drugs. Exam and Labs:  /78   Pulse 70   Resp 16   Ht 6' 3\" (1.905 m)   Wt 184 lb (83.5 kg)   SpO2 97%   BMI 23.00 kg/m² Constitutional:  NAD, comfortable  Head: NC,AT. Eyes: No scleral icterus. Neck:  Neck supple. No JVD present. Throat: moist mucous membranes. Chest: Effort normal & normal respiratory excursion . Neurological: alert, conversant and oriented . Skin: Skin is not cold. No obvious systemic rash noted. Not diaphoretic. No erythema. Psychiatric:  Grossly normal mood and affect. Behavior appears normal. Extremities:  no clubbing or cyanosis. Abdomen: non distended    Lungs:breath sounds normal. No stridor. distress, wheezes or  Rales. Heart:2/6 medium pitched, medium length murmur, heard throughout, loudest at apex normal rate, regular rhythm, normal S1, S2, no rubs, clicks or gallops , PMI non displaced. Edema: Edema is trace sockline.   No results found for: CHOL, CHOLX, CHLST, CHOLV, HDL, HDLP, LDL, LDLC, DLDLP, TGLX, TRIGL, TRIGP, CHHD, CHHDX  Lab Results   Component Value Date/Time    Sodium 141 09/24/2020 04:32 PM    Potassium 4.2 09/24/2020 04:32 PM    Chloride 100 09/24/2020 04:32 PM    CO2 26 09/24/2020 04:32 PM    Glucose 96 09/24/2020 04:32 PM    BUN 14 09/24/2020 04:32 PM    Creatinine 1.06 09/24/2020 04:32 PM    BUN/Creatinine ratio 13 09/24/2020 04:32 PM    GFR est AA 89 09/24/2020 04:32 PM    GFR est non-AA 77 09/24/2020 04:32 PM    Calcium 9.9 09/24/2020 04:32 PM      Wt Readings from Last 3 Encounters:   12/13/22 184 lb (83.5 kg)   12/06/22 191 lb (86.6 kg)   12/23/21 191 lb (86.6 kg)      BP Readings from Last 3 Encounters:   12/13/22 110/78   12/23/21 120/70   12/13/21 120/70      Current Outpatient Medications   Medication Sig    nebivoloL (BYSTOLIC) 5 mg tablet TAKE 1 TABLET DAILY    latanoprost (XALATAN) 0.005 % ophthalmic solution Administer 1 Drop to both eyes nightly. multivitamin (ONE A DAY) tablet Take 1 Tab by mouth daily. cetirizine (ZYRTEC) 10 mg tablet Take 10 mg by mouth daily. No current facility-administered medications for this visit. Impression see above. This note was created using voice recognition software. Despite editing, there may be syntax errors. Mariola Del Valle

## 2022-12-13 NOTE — LETTER
12/29/2022    Patient: Marjo Pouch   YOB: 1960   Date of Visit: 12/13/2022     Rhiannon Maloney, [phone]  Via Fax: [phone]    Dear Rhiannon Maloney MD,      Thank you for referring Mr. Diaz Neal to [address] for evaluation. My notes for this consultation are attached. If you have questions, please do not hesitate to call me. I look forward to following your patient along with you.       Sincerely,    Alok Dowell MD

## 2022-12-13 NOTE — TELEPHONE ENCOUNTER
Enrolled with [employer] - Ordered and being shipped to patient's home address on file. ETA within 5-7 business days. Message  Received: Today  Patricia Narayanan, MARCE Chu;  Roz Habermann  Please order a 30 day loop for BBB, thanks

## 2022-12-13 NOTE — PATIENT INSTRUCTIONS
Please call [phone] to schedule your cardiac MRI at [facility] or [address] [facility]. You have been scheduled for a stress echocardiogram. Please arrive 15 minutes prior to your appointment. Wear comfortable clothes and shoes. Please do not eat or drink anything other than water two hours prior to test. Please do not take your bystolic 24 hours prior. A 30 day loop monitor has been ordered for you. This will be mailed to the address given to us in the next 5-7 business days. Please read over the instructions given to you about [employer] loop monitors. If you have any insurance questions regarding coverage and out of pocket cost please contact the number below. If you have any billing questions regarding deductible or responsibility call [phone]. If you need additional supplies or issue with your monitor please call [phone]. We will call with results and see you back in 6 months.

## 2022-12-29 PROBLEM — I48.0 PAROXYSMAL ATRIAL FIBRILLATION (HCC): Status: ACTIVE | Noted: 2017-11-05

## 2022-12-29 PROBLEM — I44.7 NEW ONSET LEFT BUNDLE BRANCH BLOCK (LBBB): Status: ACTIVE | Noted: 2022-12-29

## 2023-01-03 ENCOUNTER — DOCUMENTATION ONLY (OUTPATIENT)
Dept: CARDIOLOGY CLINIC | Age: 63
End: 2023-01-03

## 2023-01-03 NOTE — PROGRESS NOTES
Dr Jamal Olson sent me note about his echo LVOT is increased and LBBB on ECG  I recommended treadmill exercise test for arrhythmia and symptoms, BP response  Need new cardiac MRI  He may need ICD or BIV ICD

## 2023-01-25 ENCOUNTER — TELEPHONE (OUTPATIENT)
Dept: CARDIOLOGY CLINIC | Age: 63
End: 2023-01-25

## 2023-01-26 NOTE — TELEPHONE ENCOUNTER
Holter /Loop monitor  Duration/Dates: 30 Day Loop monitor from January 24, 2022 to January 22, 2023  Average heart rate: 88 bpm  Findings:  Sinus rhythm with IVCD heart rate control 78% bradycardic 21% tachycardic 2%. There were 1133 PVCs and 2075 PACs. These were present less than 1% in both cases. There were 2 manually detected events which were accidental pushes. Overall sinus rhythm with interventricular conduction delay there is no arrhythmia such as ventricular tachycardia.   Let patient know that his monitor looked good with stable rhythm and no arrhythmia

## 2023-02-03 ENCOUNTER — ANCILLARY PROCEDURE (OUTPATIENT)
Dept: CARDIOLOGY CLINIC | Age: 63
End: 2023-02-03
Payer: COMMERCIAL

## 2023-02-03 VITALS
DIASTOLIC BLOOD PRESSURE: 56 MMHG | HEIGHT: 75 IN | SYSTOLIC BLOOD PRESSURE: 104 MMHG | BODY MASS INDEX: 22.88 KG/M2 | WEIGHT: 184 LBS

## 2023-02-03 DIAGNOSIS — I42.1 HYPERTROPHIC OBSTRUCTIVE CARDIOMYOPATHY (HOCM) (HCC): ICD-10-CM

## 2023-02-03 DIAGNOSIS — I34.0 NON-RHEUMATIC MITRAL REGURGITATION: ICD-10-CM

## 2023-02-03 DIAGNOSIS — I34.89 SYSTOLIC ANTERIOR MOVEMENT OF MITRAL VALVE: ICD-10-CM

## 2023-02-03 DIAGNOSIS — I48.0 PAROXYSMAL ATRIAL FIBRILLATION (HCC): ICD-10-CM

## 2023-02-03 LAB
STRESS ANGINA INDEX: 0
STRESS BASELINE DIAS BP: 56 MMHG
STRESS BASELINE HR: 81 BPM
STRESS BASELINE SYS BP: 104 MMHG
STRESS ESTIMATED WORKLOAD: 13.4 METS
STRESS EXERCISE DUR MIN: 11 MIN
STRESS EXERCISE DUR SEC: 3 SEC
STRESS O2 SAT PEAK: 95 %
STRESS O2 SAT REST: 97 %
STRESS PEAK DIAS BP: 76 MMHG
STRESS PEAK SYS BP: 170 MMHG
STRESS PERCENT HR ACHIEVED: 104 %
STRESS POST PEAK HR: 166 BPM
STRESS RATE PRESSURE PRODUCT: NORMAL BPM*MMHG
STRESS TARGET HR: 159 BPM

## 2023-03-08 ENCOUNTER — HOSPITAL ENCOUNTER (OUTPATIENT)
Dept: MRI IMAGING | Age: 63
Discharge: HOME | End: 2023-03-08
Attending: SPECIALIST

## 2023-03-08 VITALS — WEIGHT: 184 LBS | BODY MASS INDEX: 23 KG/M2

## 2023-03-08 DIAGNOSIS — I42.1 HYPERTROPHIC OBSTRUCTIVE CARDIOMYOPATHY (HOCM) (HCC): ICD-10-CM

## 2023-03-08 DIAGNOSIS — I49.3 SYMPTOMATIC PVCS: ICD-10-CM

## 2023-03-08 DIAGNOSIS — I34.89 SYSTOLIC ANTERIOR MOVEMENT OF MITRAL VALVE: ICD-10-CM

## 2023-03-08 DIAGNOSIS — I48.0 PAROXYSMAL ATRIAL FIBRILLATION (HCC): ICD-10-CM

## 2023-03-08 DIAGNOSIS — I34.0 NON-RHEUMATIC MITRAL REGURGITATION: ICD-10-CM

## 2023-04-23 ENCOUNTER — TRANSCRIBE ORDERS (OUTPATIENT)
Facility: HOSPITAL | Age: 63
End: 2023-04-23

## 2023-04-23 DIAGNOSIS — I48.0 PAROXYSMAL ATRIAL FIBRILLATION (HCC): Primary | ICD-10-CM

## 2023-04-23 DIAGNOSIS — I34.0 NON-RHEUMATIC MITRAL REGURGITATION: ICD-10-CM

## 2023-04-23 DIAGNOSIS — I49.3 SYMPTOMATIC PVCS: ICD-10-CM

## 2023-04-23 DIAGNOSIS — I42.1 HYPERTROPHIC OBSTRUCTIVE CARDIOMYOPATHY (HCC): ICD-10-CM

## 2023-04-23 DIAGNOSIS — I34.89 SYSTOLIC ANTERIOR MOVEMENT OF MITRAL VALVE: ICD-10-CM

## 2023-05-21 RX ORDER — NEBIVOLOL 5 MG/1
1 TABLET ORAL DAILY
COMMUNITY
Start: 2022-08-02

## 2023-05-21 RX ORDER — LATANOPROST 50 UG/ML
1 SOLUTION/ DROPS OPHTHALMIC
COMMUNITY
Start: 2020-08-06

## 2023-06-06 ENCOUNTER — HOSPITAL ENCOUNTER (OUTPATIENT)
Facility: HOSPITAL | Age: 63
Discharge: HOME | End: 2023-06-09
Payer: COMMERCIAL

## 2023-06-06 VITALS
BODY MASS INDEX: 21.76 KG/M2 | HEIGHT: 75 IN | HEART RATE: 67 BPM | WEIGHT: 175 LBS | OXYGEN SATURATION: 100 % | DIASTOLIC BLOOD PRESSURE: 68 MMHG | SYSTOLIC BLOOD PRESSURE: 120 MMHG | RESPIRATION RATE: 20 BRPM | TEMPERATURE: 98.5 F

## 2023-06-06 DIAGNOSIS — I49.3 SYMPTOMATIC PVCS: ICD-10-CM

## 2023-06-06 DIAGNOSIS — I34.89 SYSTOLIC ANTERIOR MOVEMENT OF MITRAL VALVE: ICD-10-CM

## 2023-06-06 DIAGNOSIS — I48.0 PAROXYSMAL ATRIAL FIBRILLATION (HCC): ICD-10-CM

## 2023-06-06 DIAGNOSIS — I34.0 NON-RHEUMATIC MITRAL REGURGITATION: ICD-10-CM

## 2023-06-06 DIAGNOSIS — I42.1 HYPERTROPHIC OBSTRUCTIVE CARDIOMYOPATHY (HCC): ICD-10-CM

## 2023-06-06 PROCEDURE — A9579 GAD-BASE MR CONTRAST NOS,1ML: HCPCS | Performed by: SPECIALIST

## 2023-06-06 PROCEDURE — 75561 CARDIAC MRI FOR MORPH W/DYE: CPT

## 2023-06-06 PROCEDURE — 6360000004 HC RX CONTRAST MEDICATION: Performed by: SPECIALIST

## 2023-06-06 PROCEDURE — 6360000002 HC RX W HCPCS: Performed by: INTERNAL MEDICINE

## 2023-06-06 RX ORDER — MIDAZOLAM HYDROCHLORIDE 1 MG/ML
5 INJECTION, SOLUTION INTRAMUSCULAR; INTRAVENOUS
Status: DISCONTINUED | OUTPATIENT
Start: 2023-06-06 | End: 2023-06-06

## 2023-06-06 RX ADMIN — GADOTERIDOL 25 ML: 279.3 INJECTION, SOLUTION INTRAVENOUS at 12:40

## 2023-06-06 RX ADMIN — MIDAZOLAM HYDROCHLORIDE 2 MG: 1 INJECTION, SOLUTION INTRAMUSCULAR; INTRAVENOUS at 12:05

## 2023-06-06 RX ADMIN — MIDAZOLAM HYDROCHLORIDE 1 MG: 1 INJECTION, SOLUTION INTRAMUSCULAR; INTRAVENOUS at 12:12

## 2023-06-06 ASSESSMENT — PAIN - FUNCTIONAL ASSESSMENT: PAIN_FUNCTIONAL_ASSESSMENT: NONE - DENIES PAIN

## 2023-06-06 NOTE — DISCHARGE INSTRUCTIONS
[address]  Radiology Department  [phone]    Date:6/6/2023        MRI With Sedation Discharge Instructions      Go home and rest and restrict your activity the next 24 hours. You have been given sedating medications, so do not drive or drink alcohol today. Resume your previous diet and medications. You may return to work and resume normal activities tomorrow. Results of your MRI will be sent to your physician as soon as they become available.

## 2023-06-07 LAB
LV EF: 65 %
LVEF MODALITY: NORMAL

## 2023-06-07 PROCEDURE — 75561 CARDIAC MRI FOR MORPH W/DYE: CPT | Performed by: INTERNAL MEDICINE

## 2023-07-04 NOTE — H&P (VIEW-ONLY)
Cardiac Electrophysiology VIRTUAL VISIT Note Pursuant to the emergency declaration under the [facility]5 waiver authority and the [employer] Act, this Virtual  Visit was conducted, with patient's consent, to reduce the patient's risk of exposure to COVID-19 and provide continuity of care for an established patient. Services were provided through an audio video synchronous discussion virtually to substitute for in-person clinic visit. Subjective:  
  
Ani Aguirre is a 63 y.o. patient who is seen because the ILR has reached its end of life and he wants it be removed rather than leaving it as is I had implanted this for him in March 2017 because if PVCs on holter and he has IHSS  on MRI and his daughter had survived sudden death but she had ARVC and not IHSS so he did not have ICD implanted Since it was implanted he has had abnormal rhythms but not VT or VF ILR with sinus tach and T wave oversensing 1/8-1/13/19, probably with exercise Implantable loop recorder show an episode of sinus pause 3-4 seconds but asymptomatic ILR showed PAF,  atrial flutter with RVR and aberrancy, more likely than VT He has not had syncope He has short PAF and atrial flutter and did not take NOAC or [address] Before the ILR implant: 
 
He has had 1000 or so of unifocal superior axis PVCs and they are relatively narrow consistent with inferoseptal origin He has IHSS and since December he has felt PVCs despite taking bystolic He has several echos and 2 stress echos He has IHSS with high LVOT gradient with the most recent 2/2017 over 60 mmHg On stress echo he had  mmHg but not sure if this is up or down during exercise based on report He has no syncope or cardiac arrest 
He has 5 children, 2 are fraternal twin One of the twin was in [employer] and had VF arrest in the gym in 2012 EP Attending    HISTORY OF PRESENT ILLNESS: HPI:  Mr. Madison is a 79 year old man with PMH HTN, HLD, T2DM on insulin, BPH, mild memory issues, CAD, ROMERO, bioprosthetic aortic valve replacement in 2009 by Dr. Ted Piña and s/p recent LE angiogram s/p angioplasty of right anterior tibial artery on ASA and Plavix.  He was recently admitted to [facility] for unsteadiness on his feet and room spinning sensation, he was r/o for Stroke and cardio work up revealed Bio AVR Prosthetic valve stenosis, BALJINDER confirmed elevated gradients, moderate prosthetic valve stenosis s/p BALJINDER and is now scheduled for TAVR on 7/3/2023.  He presently feels better since discharge, has some ROMERO after walking long distances.  Denies dizziness while in PST, after PST was heading to Dr. Rivera's office (Cardiothoracic Surgery).  He is accompanied by his granddaughter. (28 Jun 2023 14:21)    He is s/p Valve-in-Valve TAVR.  The procedure went well. Pre-EKG showed a first degree AV block which is chronic.  Post-EKG showed a new LBBB 138ms, which has turned out to be transient. This has resolved on transport upstairs to recovery in [facility].  Feeling well, no leg or flank pain, no chest pain, no shortness of breath.  A 10 pt ROS is otherwise negative.  date of service 7/4- resting in bed, no new complaints, no tele events overnight. ready to go home.    PAST MEDICAL & SURGICAL HISTORY:  HTN (hypertension)  Hyperlipidemia  BPH (benign prostatic hypertrophy)  GERD (gastroesophageal reflux disease)  Chronic mastoiditis of left side  Gall stones  HTN (hypertension)  DM (diabetes mellitus)  High cholesterol  Aortic valve replaced  2009 with bovine tissue valve  Hx of cholecystectomy  S/P AVR (aortic valve replacement)  2009 @ [place] (bovine)  History of cholecystectomy  History of ear surgery  ? surgery on left ear    amLODIPine   Tablet 10 milliGRAM(s) Oral daily  aspirin enteric coated 81 milliGRAM(s) Oral daily  atorvastatin 40 milliGRAM(s) Oral at bedtime  budesonide  80 MICROgram(s)/formoterol 4.5 MICROgram(s) Inhaler 1 Puff(s) Inhalation two times a day  clopidogrel Tablet 75 milliGRAM(s) Oral daily  dextrose 5%. 1000 milliLiter(s) IV Continuous <Continuous>  dextrose 5%. 1000 milliLiter(s) IV Continuous <Continuous>  dextrose 50% Injectable 12.5 Gram(s) IV Push once  dextrose 50% Injectable 25 Gram(s) IV Push once  dextrose 50% Injectable 25 Gram(s) IV Push once  dextrose Oral Gel 15 Gram(s) Oral once PRN  gabapentin 100 milliGRAM(s) Oral three times a day  glucagon  Injectable 1 milliGRAM(s) IntraMuscular once  insulin glargine Injectable (LANTUS) 9 Unit(s) SubCutaneous at bedtime  insulin lispro (ADMELOG) corrective regimen sliding scale   SubCutaneous at bedtime  insulin lispro (ADMELOG) corrective regimen sliding scale   SubCutaneous three times a day before meals  insulin lispro Injectable (ADMELOG) 12 Unit(s) SubCutaneous three times a day before meals  isosorbide   mononitrate ER Tablet (IMDUR) 60 milliGRAM(s) Oral daily  memantine 5 milliGRAM(s) Oral two times a day  metoprolol succinate ER 25 milliGRAM(s) Oral daily  montelukast 10 milliGRAM(s) Oral daily  multivitamin 1 Tablet(s) Oral daily                            12.7   7.54  )-----------( 170      ( 04 Jul 2023 06:00 )             39.1       07-04    139  |  102  |  26<H>  ----------------------------<  117<H>  4.1   |  23  |  1.39<H>    Ca    8.8      04 Jul 2023 06:00        T(C): 36.8 (07-04-23 @ 12:38), Max: 37 (07-04-23 @ 04:19)  HR: 78 (07-04-23 @ 12:38) (67 - 89)  BP: 142/89 (07-04-23 @ 12:38) (131/79 - 157/79)  RR: 18 (07-04-23 @ 12:38) (18 - 18)  SpO2: 98% (07-04-23 @ 12:38) (94% - 98%)  Wt(kg): --    I&O's Summary    03 Jul 2023 07:01  -  04 Jul 2023 07:00  --------------------------------------------------------  IN: 530 mL / OUT: 2550 mL / NET: -2020 mL    04 Jul 2023 07:01  -  04 Jul 2023 14:41  --------------------------------------------------------  IN: 720 mL / OUT: 800 mL / NET: -80 mL        Appearance: Normal appearing adult male in no acute distress, resting comfortably	  HEENT:   Normal oral mucosa, PERRL, EOMI	  Lymphatic: No lymphadenopathy , no edema  Cardiovascular: Normal S1 S2, No JVD, No murmurs , Peripheral pulses palpable 2+ bilaterally  Respiratory: Lungs clear to auscultation, normal effort 	  Gastrointestinal:  Soft, Non-tender, + BS	  Skin: No rashes, No ecchymoses, No cyanosis, warm to touch  Musculoskeletal: Normal range of motion, normal strength  Psychiatry:  Mood & affect appropriate      TELEMETRY: NSR, narrow QRS, first degree AVB	    ECG: NSR, MA 268ms (chronic), briefly a LBBB 130-140ms which has since resolved. 	      ASSESSMENT/PLAN: Mr Madison is a very pleasant 79y Male who underwent valve-in-valve TAVR.  Brief LBBB post TAVR implant, resolved.  Has baseline first degree AV block that is unchanged.  Uneventful overnight.  Ready for DC home on post op day #1.  MCOT per CTS protocol.    Stable for discharge.  Continue DAPT post TAVR.  Continue metoprolol.  Continue insulin for diabetes mellitus.    Tony Palmer M.D.  Cardiac Electrophysiology    office [phone]  pager [phone] She was transferred to [facility] and had S ICD by Dr Aftab Lea She then has had 2 VF ICD shocks and is now told to go to [facility] as there is concern for ARVC. She was in [place] at the time of third cardiac arrest 
Her twin sister and other 3 siblings have been seen by Dr Xochilt Hawkins and pediatric cardiologist and he is told that they are fine He is avid exercise person Cardiac MRI: 3/2017 1. Hypertrophic cardiomyopathy with severe hypertrophy of the mid inferoseptal 
wall [id number] mm and moderate hypertrophy of the basal anteroseptal wall 
measuring 16 mm. Hyperdynamic left ventricular systolic function. LVEF 75%. 2. There is no resting left ventricular outflow tract obstruction with systolic 
anterior motion of the anterior mitral valve leaflet and anterior chordae. 3. Normal right ventricular size and systolic function. RVEF 60%. 4. Elongated anterior mitral valve leaflet otherwise normal MV structure. Systolic anterior motion of the anterior mitral valve leaflet and anterior chordae causing significant resting left ventricular outflow tract obstruction. Mild to moderate 1-2+ mitral regurgitation. 5. On LGE study, there is no myocardial enhancement. There is no myocardial 
scar. There is no features of recent or old myocardial infarction. There is no 
features of infiltrative sarcoidosis or amyloidosis. All myocardial walls are 
completely viable. 6. Normal pleura and pericardium. Previous cardiac MRI 2013 Problem List  Date Reviewed: 9/4/2020 Codes Class Noted PAF (paroxysmal atrial fibrillation) (HCC) ICD-10-CM: I48.0 ICD-9-CM: 427.31  11/5/2017 Status post placement of implantable loop recorder ICD-10-CM: Z95.818 ICD-9-CM: V45.09  3/22/2017 Overview Signed 3/22/2017  3:34 PM by Laney Iverson MD  
  3/22/2017 Symptomatic PVCs ICD-10-CM: I49.3 ICD-9-CM: 427.69  3/22/2017 Hypercholesteremia ICD-10-CM: E78.00 ICD-9-CM: 272.0  8/24/2016 Family history of cardiac arrest ICD-10-CM: Z82.49 
ICD-9-CM: V17.49  Unknown Overview Signed 2015 12:48 PM by Felicity Mckeon MD  
  luisemiliano on treadmill, followed by EP at [facility] Quynh [facility] Mitral regurgitation ICD-10-CM: I34.0 ICD-9-CM: 424.0  Unknown Systolic anterior movement of mitral valve ICD-10-CM: I34.8 ICD-9-CM: 424.0  Unknown Overview Signed 2015 12:49 PM by Felciity Mckeon MD  
  JUNIE 
  
  
   
 Abnormal EKG ICD-10-CM: R94.31 
ICD-9-CM: 794.31  2012 Hypertrophic obstructive cardiomyopathy (HOCM) ([facility]) ICD-10-CM: I42.1 ICD-9-CM: 425.11  2012 Overview Signed 2013 11:08 AM by Adrain Apgar  
  12  Echo:  JUNIE with 50 mmHg subaortic gradient, mild septal hypertrophy 1.4 cm. LVEF75% Current Outpatient Medications Medication Sig Dispense Refill  latanoprost (XALATAN) 0.005 % ophthalmic solution  cetirizine (ZYRTEC) 10 mg tablet Take 10 mg by mouth daily.  nebivolol (BYSTOLIC) 5 mg tablet Take 1 Tab by mouth daily. 90 Tab 3  
 multivitamin (ONE A DAY) tablet Take 1 Tab by mouth daily. No Known Allergies Past Medical History:  
Diagnosis Date  Asymmetric septal hypertrophy ([address] MRI 2013 and echo  Family history of cardiac arrest   
 светлана on treadmill, followed by EP at [facility] Quyn[facility]  Hypercholesteremia  Hypertrophic cardiomyopathy ([facility] dynamic obstruction during Valsalva in the outflow tract, peak velocity of 3.6 cm/s and a peak gradient of 50 mmHg  Mitral regurgitation  PAF (paroxysmal atrial fibrillation) ([address] 2017  Systolic anterior movement of mitral valve JUNIE  
 
No past surgical history Social History Tobacco Use  Smoking status: Never Smoker  Smokeless tobacco: Never Used Substance Use Topics  Alcohol use: Yes Alcohol/week: 0.0 standard drinks Comment: occ Review of Systems: Constitutional: Negative for fever, chills, weight loss, malaise/fatigue. HEENT: Negative for nosebleeds, vision changes. Respiratory: Negative for cough, hemoptysis Cardiovascular: Negative for chest pain, orthopnea, claudication, leg swelling, syncope, and PND. Gastrointestinal: Negative for nausea, vomiting, diarrhea, blood in stool and melena. Genitourinary: Negative for dysuria, and hematuria. Musculoskeletal: Negative for myalgias, arthralgia. Skin: Negative for rash. Heme: Does not bleed or bruise easily. Neurological: Negative for speech change and focal weakness Objective:  
Due to this being a TeleHealth evaluation, many elements of the physical examination are unable to be assessed. General: Well developed, in no acute distress, cooperative and alert HEENT: Pupils equal/round. No marked JVD visible on video. Respiratory: No audible wheezing, no signs of respiratory distress, lips non cyanotic Extremities:  No edema Neuro: A&Ox3, speech clear, no facial droop, answering questions appropriately Skin: Skin color is normal. Non diaphoretic on visible skin during exam 
 
 
Assessment/Plan: ICD-10-CM ICD-9-CM 1. Encounter for loop recorder at end of battery life  Z45.09 V53.39   
2. Paroxysmal atrial fibrillation (HCC)  I48.0 427.31   
3. Family history of cardiac arrest  Z82.49 V17.49   
4. Hypertrophic obstructive cardiomyopathy (HOCM) (HCC)  I42.1 425.11   
5. Systolic anterior movement of mitral valve  I34.8 424.0 His repeat MRI still does not meet criteria for ICD implant for primary prevention of sudden death His daughter had survived cardiac arrest and had ICD for secondary prevention but she has ARVC and not IHSS He has not have VT or VF He wants to have the  ILR removed We discussed risks and benefits and I even told him that he could keep the  ILR as is but he declined and wanted it out He said he had checked with his PCP for removal in his PCP's office but was told it was not going to be done so he now agrees to have ILR removed at [facility]  
he will need conscious sedation for this Thank you for involving me in this patient's care and please call with further concerns or questions. Leila Nunes M.D. Electrophysiology/Cardiology [facility] and Vascular [place] [facility], [facility] [address] [place] [address] 
[phone]

## 2023-07-05 ENCOUNTER — OFFICE VISIT (OUTPATIENT)
Age: 63
End: 2023-07-05
Payer: COMMERCIAL

## 2023-07-05 VITALS
BODY MASS INDEX: 22.26 KG/M2 | SYSTOLIC BLOOD PRESSURE: 120 MMHG | RESPIRATION RATE: 16 BRPM | HEART RATE: 71 BPM | DIASTOLIC BLOOD PRESSURE: 70 MMHG | OXYGEN SATURATION: 97 % | WEIGHT: 179 LBS | HEIGHT: 75 IN

## 2023-07-05 DIAGNOSIS — I34.89 SYSTOLIC ANTERIOR MOVEMENT OF MITRAL VALVE: ICD-10-CM

## 2023-07-05 DIAGNOSIS — I48.0 PAROXYSMAL ATRIAL FIBRILLATION (HCC): ICD-10-CM

## 2023-07-05 DIAGNOSIS — I42.1 HYPERTROPHIC OBSTRUCTIVE CARDIOMYOPATHY (HOCM) (HCC): Primary | ICD-10-CM

## 2023-07-05 DIAGNOSIS — I49.3 SYMPTOMATIC PVCS: ICD-10-CM

## 2023-07-05 DIAGNOSIS — Z82.49 FAMILY HISTORY OF CARDIAC ARREST: ICD-10-CM

## 2023-07-05 DIAGNOSIS — I34.0 NON-RHEUMATIC MITRAL REGURGITATION: ICD-10-CM

## 2023-07-05 DIAGNOSIS — E78.00 HYPERCHOLESTEREMIA: ICD-10-CM

## 2023-07-05 DIAGNOSIS — I44.7 NEW ONSET LEFT BUNDLE BRANCH BLOCK (LBBB): ICD-10-CM

## 2023-07-05 PROCEDURE — 99214 OFFICE O/P EST MOD 30 MIN: CPT | Performed by: SPECIALIST

## 2023-07-05 NOTE — PROGRESS NOTES
Ronnie Hodgson     1960       Uri Lewis MD, [facility]  Date of Visit-7/5/2023   PCP is Ginette Del Real MD   [facility] and Vascular [place]  [facility]  HPI:  Ronnie Hodgson is a 65 y.o. male   IHSS with PVCs. The MRI confirmed IHSS with no scarring, wall thickness inferior 19 mm basal anterior septum 16 mm. There was an elongated MV leaflet causing mild AVERY, based on this we recommended that he engage in non competitive athletics. His daughter has had sudden death with revival with ARVCD. His family has been tested and no other members of the family have 1 [facility] or [facility]. Has IRL with brief AFib ILR removed on 9/25/20. Had few PVCs, they have resolved as he has limited salt and caffeine  Stress test 7/18/17 he walked for 11 minutes to a peak HR of 152 which is 92%. D  XOL- December 2020 which showed XOL of 232, , and HDL 57  ? Pericarditis (self dx ) Jan 2021 -\"positional\" CP afterwards, noting some minimal pressure and pain when taking deep breaths, exercising, or doing heavy exertion. He reports that the sx's lasted for about 5-6 months  Echo 12/6/22    Today. .  6 months  He feels very well, he is very active , exercises mainly weights and sprints, avoids long aerobic activity  Has been following [facility] podcasts on HOCM and has questions about whether he should avoid exercise , is it all genetic?, what are the differencs in the previous MRI s from 2017 and he has list of the comparative measurements  He feels no limitations  Denies chest pain, edema, syncope or shortness of breath at rest   Has no tachycardia , palpitations or sense of arrythmia        Follow up MRI showed below results , I had discussed with Dr Selma Ceja, do not see indication for AICD but continue monitoring  LVOT increased on echo and new BBB IN December  Had Holter echo and MRI    Duration/Dates: 30 Day Loop monitor from January 24, 2022 to January 22, 2023  Average heart rate: 88

## 2023-07-06 PROBLEM — Z95.818 STATUS POST PLACEMENT OF IMPLANTABLE LOOP RECORDER: Status: RESOLVED | Noted: 2017-03-22 | Resolved: 2023-07-06

## 2023-07-12 ENCOUNTER — TELEPHONE (OUTPATIENT)
Age: 63
End: 2023-07-12

## 2023-07-12 ENCOUNTER — PATIENT MESSAGE (OUTPATIENT)
Age: 63
End: 2023-07-12

## 2023-07-12 DIAGNOSIS — E78.00 HYPERCHOLESTEREMIA: Primary | ICD-10-CM

## 2023-07-25 NOTE — TELEPHONE ENCOUNTER
Patient's 30 day loop ready for review.     Future Appointments   Date Time Provider Johnathon Saxena   2/3/2023  3:00 PM JAS YUSUF AMB   3/8/2023  2:00 PM [facility] MRI 1 [facility]   6/21/2023  9:20 AM Grant Michael MD [facility] AMB [Rash] : rash [Dry Skin] : dry skin [Negative] : Genitourinary

## 2023-07-28 DIAGNOSIS — I48.0 PAROXYSMAL ATRIAL FIBRILLATION (HCC): Primary | ICD-10-CM

## 2023-07-28 RX ORDER — NEBIVOLOL 5 MG/1
TABLET ORAL
Qty: 90 TABLET | Refills: 3 | Status: SHIPPED | OUTPATIENT
Start: 2023-07-28

## 2023-07-28 NOTE — TELEPHONE ENCOUNTER
Per VO by MD.    Future Appointments   Date Time Provider [address]   [facility]   1/8/2024  1:40 PM MD OSBALDO Feliciano AMB

## 2023-08-07 ENCOUNTER — HOSPITAL ENCOUNTER (OUTPATIENT)
Facility: HOSPITAL | Age: 63
Discharge: HOME | End: 2023-08-10
Attending: SPECIALIST
Payer: COMMERCIAL

## 2023-08-07 DIAGNOSIS — E78.00 HYPERCHOLESTEREMIA: ICD-10-CM

## 2023-08-07 PROCEDURE — 75571 CT HRT W/O DYE W/CA TEST: CPT

## 2023-10-12 DIAGNOSIS — E78.00 HYPERCHOLESTEREMIA: ICD-10-CM

## 2023-10-12 RX ORDER — ROSUVASTATIN CALCIUM 20 MG/1
20 TABLET, COATED ORAL NIGHTLY
Qty: 90 TABLET | Refills: 3 | Status: SHIPPED | OUTPATIENT
Start: 2023-10-12

## 2023-10-12 NOTE — TELEPHONE ENCOUNTER
Requested Prescriptions     Signed Prescriptions Disp Refills    rosuvastatin (CRESTOR) 20 MG tablet 90 tablet 3     Sig: Take 1 tablet by mouth nightly     Authorizing Provider: Fede Smith     Ordering User: Laly Diop     Verbal order per Dr. Caridad Alonso.    Future Appointments   Date Time Provider [address]   1/8/2024  1:40 PM MD OSBALDO Salvador AMB

## 2024-02-07 DIAGNOSIS — E78.00 HYPERCHOLESTEREMIA: ICD-10-CM

## 2024-02-07 LAB
ALBUMIN SERPL-MCNC: 3.7 G/DL (ref 3.5–5)
ALBUMIN/GLOB SERPL: 1.5 (ref 1.1–2.2)
ALP SERPL-CCNC: 60 U/L (ref 45–117)
ALT SERPL-CCNC: 37 U/L (ref 12–78)
AST SERPL-CCNC: 26 U/L (ref 15–37)
BILIRUB DIRECT SERPL-MCNC: 0.2 MG/DL (ref 0–0.2)
BILIRUB SERPL-MCNC: 0.5 MG/DL (ref 0.2–1)
CHOLEST SERPL-MCNC: 161 MG/DL
GLOBULIN SER CALC-MCNC: 2.5 G/DL (ref 2–4)
HDLC SERPL-MCNC: 55 MG/DL
HDLC SERPL: 2.9 (ref 0–5)
LDLC SERPL CALC-MCNC: 94.8 MG/DL (ref 0–100)
PROT SERPL-MCNC: 6.2 G/DL (ref 6.4–8.2)
TRIGL SERPL-MCNC: 56 MG/DL
VLDLC SERPL CALC-MCNC: 11.2 MG/DL

## 2024-02-12 ENCOUNTER — OFFICE VISIT (OUTPATIENT)
Age: 64
End: 2024-02-12

## 2024-02-12 VITALS
WEIGHT: 181 LBS | OXYGEN SATURATION: 98 % | SYSTOLIC BLOOD PRESSURE: 130 MMHG | BODY MASS INDEX: 22.5 KG/M2 | HEART RATE: 62 BPM | RESPIRATION RATE: 16 BRPM | DIASTOLIC BLOOD PRESSURE: 70 MMHG | HEIGHT: 75 IN

## 2024-02-12 DIAGNOSIS — I48.0 PAROXYSMAL ATRIAL FIBRILLATION (HCC): ICD-10-CM

## 2024-02-12 DIAGNOSIS — E78.00 HYPERCHOLESTEREMIA: ICD-10-CM

## 2024-02-12 DIAGNOSIS — I34.89 SYSTOLIC ANTERIOR MOVEMENT OF MITRAL VALVE: ICD-10-CM

## 2024-02-12 DIAGNOSIS — I42.1 HYPERTROPHIC OBSTRUCTIVE CARDIOMYOPATHY (HOCM) (HCC): Primary | ICD-10-CM

## 2024-02-12 DIAGNOSIS — I34.0 NON-RHEUMATIC MITRAL REGURGITATION: ICD-10-CM

## 2024-02-12 DIAGNOSIS — I49.3 SYMPTOMATIC PVCS: ICD-10-CM

## 2024-02-12 DIAGNOSIS — Z82.49 FAMILY HISTORY OF CARDIAC ARREST: ICD-10-CM

## 2024-02-12 DIAGNOSIS — I44.7 NEW ONSET LEFT BUNDLE BRANCH BLOCK (LBBB): ICD-10-CM

## 2024-02-12 RX ORDER — MULTIVITAMIN
1 TABLET ORAL DAILY
COMMUNITY

## 2024-02-12 NOTE — PROGRESS NOTES
measurements  He feels no limitations  Denies chest pain, edema, syncope or shortness of breath at rest   Has no tachycardia , palpitations or sense of arrythmia    EKG-2/12/2024-sinus rhythm left bundle branch block    Follow up MRI s  LVOT increased on echo and new BBB IN December  Had Holter echo and MRI.  Holter showed at 3 days sinus rhythm no A-fib occasional PACs rare PVCs  Stress echo was notable for exercise at 10.3 METS resting gradient 34 Valsalva 55 and post exercise 81  Duration/Dates: 30 Day Loop monitor from January 24, 2022 to January 22, 2023  Average heart rate: 88 bpm  Findings:  Sinus rhythm with IVCD heart rate control 78% bradycardic 21% tachycardic 2%.  There were 1133 PVCs and 2075 PACs.  These were present less than 1% in both cases.  There were 2 manually detected events which were accidental pushes.  Overall sinus rhythm with interventricular conduction delay there is no arrhythmia such as ventricular tachycardia.  Let patient know that his monitor looked good with stable rhythm and no arrhythm  See echo and MRI below  Assessment/Plan:       1. Hypertrophic obstructive cardiomyopathy (HOCM) (HCC)  No limitation with ADL's, murmur unchanged, no CHF.   MRI 2017, with walls measuring 19 and 16 mm and no outflow obstruction. AVERY was present causing slight LVOT and there was no unusual LGE enhancement.   With no scar, Dr. Neumann felt defibrillator was not needed. An IRL that was In for two years showed no ventricular arrhythmia.     Relatively recently EKG demonstrates a new left bundle branch block  asymptomatic and echo, stress echo, loop and MRI are reviewed  Scar if focal and at 5% of thickness, data suggests AICD consider at 30% scar    Genetic with variable penetrance and genetic studies not very definitive  Have checked phenotype with all children  We did not previously recommend no macavatem   since NYHA 1 and asymptomatic     He has decreased alcohol.   He likes to swim.  He is exercising  Not applicable

## 2024-08-12 ENCOUNTER — OFFICE VISIT (OUTPATIENT)
Age: 64
End: 2024-08-12
Payer: COMMERCIAL

## 2024-08-12 VITALS
HEIGHT: 75 IN | SYSTOLIC BLOOD PRESSURE: 136 MMHG | HEART RATE: 66 BPM | DIASTOLIC BLOOD PRESSURE: 72 MMHG | OXYGEN SATURATION: 97 % | BODY MASS INDEX: 22.13 KG/M2 | WEIGHT: 178 LBS | RESPIRATION RATE: 18 BRPM

## 2024-08-12 DIAGNOSIS — Z82.49 FAMILY HISTORY OF CARDIAC ARREST: ICD-10-CM

## 2024-08-12 DIAGNOSIS — I48.0 PAROXYSMAL ATRIAL FIBRILLATION (HCC): ICD-10-CM

## 2024-08-12 DIAGNOSIS — I34.89 SYSTOLIC ANTERIOR MOVEMENT OF MITRAL VALVE: ICD-10-CM

## 2024-08-12 DIAGNOSIS — I42.1 HYPERTROPHIC OBSTRUCTIVE CARDIOMYOPATHY (HOCM) (HCC): Primary | ICD-10-CM

## 2024-08-12 DIAGNOSIS — E78.00 HYPERCHOLESTEREMIA: ICD-10-CM

## 2024-08-12 DIAGNOSIS — I49.3 SYMPTOMATIC PVCS: ICD-10-CM

## 2024-08-12 DIAGNOSIS — I44.7 NEW ONSET LEFT BUNDLE BRANCH BLOCK (LBBB): ICD-10-CM

## 2024-08-12 DIAGNOSIS — I34.0 NON-RHEUMATIC MITRAL REGURGITATION: ICD-10-CM

## 2024-08-12 PROCEDURE — 99214 OFFICE O/P EST MOD 30 MIN: CPT | Performed by: SPECIALIST

## 2024-08-12 RX ORDER — ASPIRIN 81 MG/1
81 TABLET ORAL DAILY
COMMUNITY

## 2024-08-12 RX ORDER — LANOLIN ALCOHOL/MO/W.PET/CERES
1000 CREAM (GRAM) TOPICAL DAILY
COMMUNITY

## 2024-08-12 RX ORDER — TAURINE 100 %
POWDER (GRAM) MISCELLANEOUS DAILY
COMMUNITY

## 2024-08-12 RX ORDER — LANOLIN ALCOHOL/MO/W.PET/CERES
1 CREAM (GRAM) TOPICAL DAILY
COMMUNITY

## 2024-08-12 RX ORDER — GINSENG 100 MG
50 CAPSULE ORAL DAILY
COMMUNITY

## 2024-08-12 RX ORDER — METHYLDOPA/HYDROCHLOROTHIAZIDE 250MG-15MG
TABLET ORAL DAILY
COMMUNITY

## 2024-08-12 RX ORDER — MAVACAMTEN 10 MG/1
10 CAPSULE, GELATIN COATED ORAL DAILY
COMMUNITY
Start: 2024-03-11

## 2024-08-12 RX ORDER — CREATINE 100 %
POWDER (GRAM) MISCELLANEOUS DAILY
COMMUNITY

## 2024-08-12 ASSESSMENT — PATIENT HEALTH QUESTIONNAIRE - PHQ9
2. FEELING DOWN, DEPRESSED OR HOPELESS: NOT AT ALL
SUM OF ALL RESPONSES TO PHQ9 QUESTIONS 1 & 2: 0
SUM OF ALL RESPONSES TO PHQ QUESTIONS 1-9: 0
1. LITTLE INTEREST OR PLEASURE IN DOING THINGS: NOT AT ALL
SUM OF ALL RESPONSES TO PHQ QUESTIONS 1-9: 0

## 2024-08-12 NOTE — PATIENT INSTRUCTIONS
Schedule follow up with Dr. Bronson in 6 months.    Have fasting labs obtained, 1 week prior to follow up.

## 2024-08-12 NOTE — PROGRESS NOTES
Hunter Hardy     1960       Uri Bronson MD, [facility]  Date of Visit-8/12/2024   PCP is Shahbaz Sevilla MD   [facility] and V[facility] [place]  [facility]  HPI:  Hunter Hardy is a 63 y.o. male   6 month follow up visit  Mr Hardy returns today in good spirits remains very active  He says after the last MRI showed changes of his HOCM he went on internet search and was concerned about his high level of exercise, he met with Dr Campbell at [facility] HOCM clinic and was placed on macaventam , since then there is no change in his high resting and Valsalva gradient so he was placed on higher dose and is getting regular echo fu with the protocol with [facility] echo     Feels well  Sometimes tired or dizzy when he does box lunges and burpees  No change in how he has felt on the new medication  Denies chest pain, edema, syncope or shortness of breath at rest   Has no tachycardia , palpitations or sense of arrythmia        IHSS with PVCs.    The MRI confirmed IHSS with no scarring, wall thickness inferior 19 mm basal anterior septum 16 mm. There was an elongated MV leaflet causing mild AVERY, based on this we recommended that he engage in non competitive athletics.  His daughter has had sudden death with revival with ARVCD. His family has been tested and no other members of the family have IHSS or ARVCD.   Has IRL with brief AFib ILR removed on 9/25/20.   Had few PVCs, they have resolved as he has limited salt and caffeine  Stress test 7/18/17 he walked for 11 minutes to a peak HR of 152 which is 92%.D  XOL- December 2020 which showed XOL of 232, , and HDL 57  ? Pericarditis (self dx ) Jan 2021 -\"positional\" CP afterwards, noting some minimal pressure and pain when taking deep breaths, exercising, or doing heavy exertion. He reports that the sx's lasted for about 5-6 months  Echo 12/6/22    Had questions about lipid risk and underwent a calcium score which was 180 and started on Crestor 20

## 2024-10-14 DIAGNOSIS — E78.00 HYPERCHOLESTEREMIA: ICD-10-CM

## 2024-10-14 RX ORDER — ROSUVASTATIN CALCIUM 20 MG/1
20 TABLET, COATED ORAL NIGHTLY
Qty: 90 TABLET | Refills: 3 | Status: SHIPPED | OUTPATIENT
Start: 2024-10-14

## 2024-10-14 NOTE — TELEPHONE ENCOUNTER
Per VO by MD.    Future Appointments   Date Time Provider Department Center   2/13/2025  8:40 AM Uri Bronson MD [facility] AMB

## 2025-01-02 DIAGNOSIS — I48.0 PAROXYSMAL ATRIAL FIBRILLATION (HCC): ICD-10-CM

## 2025-01-02 RX ORDER — NEBIVOLOL 5 MG/1
TABLET ORAL
Qty: 90 TABLET | Refills: 3 | Status: SHIPPED | OUTPATIENT
Start: 2025-01-02

## 2025-02-06 DIAGNOSIS — E78.00 HYPERCHOLESTEREMIA: ICD-10-CM

## 2025-02-06 LAB
ALBUMIN SERPL-MCNC: 4.4 G/DL (ref 3.5–5)
ALBUMIN/GLOB SERPL: 1.3 (ref 1.1–2.2)
ALP SERPL-CCNC: 68 U/L (ref 45–117)
ALT SERPL-CCNC: 32 U/L (ref 12–78)
ANION GAP SERPL CALC-SCNC: 6 MMOL/L (ref 2–12)
AST SERPL-CCNC: 21 U/L (ref 15–37)
BILIRUB SERPL-MCNC: 0.6 MG/DL (ref 0.2–1)
BUN SERPL-MCNC: 19 MG/DL (ref 6–20)
BUN/CREAT SERPL: 18 (ref 12–20)
CALCIUM SERPL-MCNC: 10.4 MG/DL (ref 8.5–10.1)
CHLORIDE SERPL-SCNC: 101 MMOL/L (ref 97–108)
CHOLEST SERPL-MCNC: 207 MG/DL
CO2 SERPL-SCNC: 30 MMOL/L (ref 21–32)
CREAT SERPL-MCNC: 1.05 MG/DL (ref 0.7–1.3)
GLOBULIN SER CALC-MCNC: 3.5 G/DL (ref 2–4)
GLUCOSE SERPL-MCNC: 115 MG/DL (ref 65–100)
HDLC SERPL-MCNC: 67 MG/DL
HDLC SERPL: 3.1 (ref 0–5)
LDLC SERPL CALC-MCNC: 127.8 MG/DL (ref 0–100)
POTASSIUM SERPL-SCNC: 4.6 MMOL/L (ref 3.5–5.1)
PROT SERPL-MCNC: 7.9 G/DL (ref 6.4–8.2)
SODIUM SERPL-SCNC: 137 MMOL/L (ref 136–145)
TRIGL SERPL-MCNC: 61 MG/DL
VLDLC SERPL CALC-MCNC: 12.2 MG/DL

## 2025-02-13 ENCOUNTER — OFFICE VISIT (OUTPATIENT)
Age: 65
End: 2025-02-13
Payer: COMMERCIAL

## 2025-02-13 VITALS
SYSTOLIC BLOOD PRESSURE: 120 MMHG | WEIGHT: 184 LBS | HEIGHT: 75 IN | BODY MASS INDEX: 22.88 KG/M2 | OXYGEN SATURATION: 97 % | HEART RATE: 67 BPM | DIASTOLIC BLOOD PRESSURE: 68 MMHG

## 2025-02-13 DIAGNOSIS — I34.89 SYSTOLIC ANTERIOR MOVEMENT OF MITRAL VALVE: ICD-10-CM

## 2025-02-13 DIAGNOSIS — I49.3 SYMPTOMATIC PVCS: ICD-10-CM

## 2025-02-13 DIAGNOSIS — I48.0 PAROXYSMAL ATRIAL FIBRILLATION (HCC): ICD-10-CM

## 2025-02-13 DIAGNOSIS — I42.1 HYPERTROPHIC OBSTRUCTIVE CARDIOMYOPATHY (HOCM) (HCC): Primary | ICD-10-CM

## 2025-02-13 DIAGNOSIS — E78.00 HYPERCHOLESTEREMIA: ICD-10-CM

## 2025-02-13 DIAGNOSIS — Z82.49 FAMILY HISTORY OF CARDIAC ARREST: ICD-10-CM

## 2025-02-13 DIAGNOSIS — I44.7 NEW ONSET LEFT BUNDLE BRANCH BLOCK (LBBB): ICD-10-CM

## 2025-02-13 DIAGNOSIS — I34.0 NON-RHEUMATIC MITRAL REGURGITATION: ICD-10-CM

## 2025-02-13 PROCEDURE — 99214 OFFICE O/P EST MOD 30 MIN: CPT | Performed by: SPECIALIST

## 2025-02-13 PROCEDURE — 93000 ELECTROCARDIOGRAM COMPLETE: CPT | Performed by: SPECIALIST

## 2025-02-13 ASSESSMENT — PATIENT HEALTH QUESTIONNAIRE - PHQ9
SUM OF ALL RESPONSES TO PHQ QUESTIONS 1-9: 0
1. LITTLE INTEREST OR PLEASURE IN DOING THINGS: NOT AT ALL
SUM OF ALL RESPONSES TO PHQ QUESTIONS 1-9: 0
2. FEELING DOWN, DEPRESSED OR HOPELESS: NOT AT ALL
SUM OF ALL RESPONSES TO PHQ9 QUESTIONS 1 & 2: 0

## 2025-02-13 NOTE — PROGRESS NOTES
Hunter Hardy     1960       Uri Bronson MD, [facility]  Date of Visit-2/13/2025   PCP is Shahbaz Sevilla MD   [facility] and Vascular [place]  [facility]  HPI:  Hunter Hardy is a 64 y.o. male   6 month follow up visit  He says after the last MRI showed changes of his HOCM he went on internet search and was concerned about his high level of exercise, he met with Dr Campbell at [facility] HOCM clinic and was placed on macaventam , since then there is no change in his high resting and Valsalva gradient so he was placed on higher dose and is getting regular echo fu with the protocol with [facility] echo     Today  Echo [facility] 1/21/2025 EF 6065% no stenosis LVOT gradient with Valsalva is 20 eccentric hypertrophy mildly dilated atria nonspecific mitral valve thickening with mildly calcified leaflets mild MR mild MAC no pericardial effusion.  Generally doing well without limitation doing HIT workouts and has noticed a definite improvement on CAM zygotes also his murmur is lessened.  He had a shoulder injury.  That is resolved with therapy.  He is aware of his echo results.  We went over that he had a troponin and BNP checked at [facility] that were favorable.  His LDL has come up a bit to 127 with cholesterol 207.  He is compliant with his statin.  His chemistry was favorable with creatinine 1.0.  He feels very well, he is very active , exercises mainly weights and sprints, avoids long aerobic activity  Had questions about lipid risk and underwent a calcium score which was 180 and started on Crestor 20 mg  in July 2023  Saw Dr. Jaden Hernandez with [facility] with hypertrophic clinic..  They noted systolic murmur augmented by standing, LV septal contact, LV severe obstruction, CMR 1.5 cm, nonreplacement fibrosis.  Dr. Hernandez expressed surprise that with his gradient he was not symptomatic.  Recommendation for A-fib screening and a VO2 test.  Stress echo planned for 2/9/2024    IHSS with PVCs.    The MRI

## (undated) DEVICE — DERMABOND SKIN ADH 0.7ML -- DERMABOND ADVANCED 12/BX

## (undated) DEVICE — SUTURE VCRL SZ 2-0 L27IN ABSRB UD L26MM SH 1/2 CIR J417H

## (undated) DEVICE — Device